# Patient Record
Sex: FEMALE | Race: WHITE | NOT HISPANIC OR LATINO | Employment: FULL TIME | URBAN - METROPOLITAN AREA
[De-identification: names, ages, dates, MRNs, and addresses within clinical notes are randomized per-mention and may not be internally consistent; named-entity substitution may affect disease eponyms.]

---

## 2017-04-21 ENCOUNTER — OFFICE VISIT (OUTPATIENT)
Dept: FAMILY MEDICINE | Facility: CLINIC | Age: 42
End: 2017-04-21
Payer: COMMERCIAL

## 2017-04-21 VITALS
TEMPERATURE: 99 F | SYSTOLIC BLOOD PRESSURE: 101 MMHG | HEART RATE: 75 BPM | BODY MASS INDEX: 37.74 KG/M2 | HEIGHT: 63 IN | RESPIRATION RATE: 16 BRPM | DIASTOLIC BLOOD PRESSURE: 73 MMHG | WEIGHT: 213 LBS | OXYGEN SATURATION: 96 %

## 2017-04-21 DIAGNOSIS — F33.1 MODERATE EPISODE OF RECURRENT MAJOR DEPRESSIVE DISORDER: ICD-10-CM

## 2017-04-21 DIAGNOSIS — J30.9 ALLERGIC RHINITIS, UNSPECIFIED ALLERGIC RHINITIS TRIGGER, UNSPECIFIED RHINITIS SEASONALITY: Primary | ICD-10-CM

## 2017-04-21 DIAGNOSIS — R68.82 DECREASED LIBIDO: ICD-10-CM

## 2017-04-21 PROCEDURE — 99999 PR PBB SHADOW E&M-EST. PATIENT-LVL III: CPT | Mod: PBBFAC,,, | Performed by: FAMILY MEDICINE

## 2017-04-21 PROCEDURE — 1160F RVW MEDS BY RX/DR IN RCRD: CPT | Mod: S$GLB,,, | Performed by: FAMILY MEDICINE

## 2017-04-21 PROCEDURE — 99214 OFFICE O/P EST MOD 30 MIN: CPT | Mod: S$GLB,,, | Performed by: FAMILY MEDICINE

## 2017-04-21 RX ORDER — BENZONATATE 100 MG/1
CAPSULE ORAL
Refills: 0 | COMMUNITY
Start: 2017-04-08 | End: 2017-04-21

## 2017-04-21 RX ORDER — FLUTICASONE PROPIONATE 50 MCG
2 SPRAY, SUSPENSION (ML) NASAL DAILY
Qty: 16 G | Refills: 2 | Status: SHIPPED | OUTPATIENT
Start: 2017-04-21 | End: 2017-07-10 | Stop reason: SDUPTHER

## 2017-04-21 RX ORDER — BUPROPION HYDROCHLORIDE 150 MG/1
150 TABLET ORAL DAILY
Qty: 30 TABLET | Refills: 11 | Status: SHIPPED | OUTPATIENT
Start: 2017-04-21 | End: 2018-08-17

## 2017-04-21 NOTE — PROGRESS NOTES
(Portions of this note were dictated using voice recognition software and may contain dictation related errors in spelling/grammar/syntax not found on text review)    CC:   Chief Complaint   Patient presents with    Medication Problem    Weight Gain       HPI: 42 y.o. female last seen in November.  History of depression, reviewed on prior note and also a noted .  Initially was on zoloft but had gotten off secondary sexual side effects.  For a while she felt that her depression was doing better but then on last visit in November she was concerned about needing something else for depression.  Started on Lexapro 10 mg a day.  Here for follow-up of his.    Lexapro is working but again she is having sexual problems with this and decreased libido.  She finds her mood is much better on it however.  She has also gained some weight.  She is not sure if his medication or lifestyle issues.  She admitted last time that her diet was fairly poor.  She feels that that is not significant changed much from last time although in the last week she did sign up for [employer] and has lost about 4 pounds over the last week.  She is also not really exercising right now it hopes to work this into her regimen going forward.    In the last month has had cough, nasal congestion, facial congestion.  No fevers.  Symptoms wax and wane.  Ears feel full like she is under water.  She has not taken any medications but did [place] and got a steroid shot which did not seem to help.  No chronic ALLERGY history.  No asthma symptoms.  No shortness of breath.  No vomiting or diarrhea.    Past Medical History:   Diagnosis Date    Depression, reactive     GERD (gastroesophageal reflux disease)     Hypothyroidism     Polycystic ovaries        Past Surgical History:   Procedure Laterality Date     SECTION      CHOLECYSTECTOMY      COLONOSCOPY N/A 2016    Procedure: COLONOSCOPY;  Surgeon: Tripp Fletcher  MD Doris;  Location: [facility];  Service: Endoscopy;  Laterality: N/A;    LAPAROSCOPIC GASTRIC BANDING         Family History   Problem Relation Age of Onset    Breast cancer Neg Hx     Colon cancer Maternal Grandmother     Colon polyps Mother      Several precancerous polyps       Social History     Social History    Marital status:      Spouse name: N/A    Number of children: N/A    Years of education: N/A     Occupational History    Not on file.     Social History Main Topics    Smoking status: Former Smoker     Packs/day: 1.00     Years: 17.00     Types: Cigarettes     Quit date: 8/16/2015    Smokeless tobacco: Never Used    Alcohol use 0.0 oz/week     0 Standard drinks or equivalent per week      Comment: rarely    Drug use: No    Sexual activity: Yes     Partners: Male      Comment:  had vasectomy     Other Topics Concern    Not on file     Social History Narrative         Lab Results   Component Value Date    WBC 7.58 07/21/2015    HGB 13.9 07/21/2015    HCT 41.6 07/21/2015     07/21/2015    CHOL 187 07/21/2015    TRIG 130 07/21/2015    HDL 41 07/21/2015    ALT 27 12/01/2015    AST 16 12/01/2015     12/01/2015    K 4.5 12/01/2015     12/01/2015    CREATININE 0.8 12/01/2015    BUN 12 12/01/2015    CO2 26 12/01/2015    TSH 1.186 07/21/2015    HGBA1C 5.4 07/21/2015    LDLCALC 120.0 07/21/2015    GLU 75 12/01/2015       ROS:  GENERAL: Fatigue, weight gain.  SKIN: No rashes, no itching.  HEAD: Headaches.  EYES: No visual changes  EARS: No ear pain or changes in hearing.  NOSE: Above  MOUTH & THROAT: No hoarseness, change in voice, or sore throat.  NODES: Denies swollen glands.  CHEST: Cough, sometimes productive.  CARDIOVASCULAR: Denies chest pain, PND, orthopnea.  ABDOMEN: No nausea, vomiting, or changes in bowel function.  URINARY: No flank pain, dysuria or hematuria.  PERIPHERAL VASCULAR: No claudication or cyanosis.  MUSCULOSKELETAL: No joint stiffness or swelling.  Denies back pain.  NEUROLOGIC: No weakness or numbness.    Vital signs reviewed  Weight today is 213 pounds, was 202 in November 2016, 196 in September 2016    PE:   APPEARANCE: Well nourished, well developed, in no acute distress.    HEAD: Normocephalic, atraumatic. No sinus ttp  EYES: PERRL. EOMI.   Conjunctivae noninjected.  EARS: TM's intact. Light reflex normal. No retraction or perforation serous effusion behind both TMs  NOSE: Turbinates congested bilaterally  MOUTH & THROAT: No tonsillar enlargement. No pharyngeal erythema or exudate.   NECK: Supple with no cervical lymphadenopathy.    CHEST: Good inspiratory effort. Lungs clear to auscultation with no wheezes or crackles.  CARDIOVASCULAR: Normal S1, S2. No rubs, murmurs, or gallops.  ABDOMEN: Bowel sounds normal. Not distended. Soft. No tenderness or masses. No organomegaly.  EXTREMITIES: No edema, cyanosis, or clubbing.  PSYCHIATRIC: Mood is described as overall euthymic on her medication as above.  Affect: The office.    IMPRESSION  1. Allergic rhinitis, unspecified allergic rhinitis trigger, unspecified rhinitis seasonality    2. Moderate episode of recurrent major depressive disorder    3. Decreased libido            PLAN  Patient does take Benadryl every night for sleep.  She can take Claritin or Allegra in the morning for the next month.  Additionally advise Flonase 2 sprays each nostril once daily for the next 3-4 weeks.  Notify if symptoms are worsening despite the above treatment.  No signs on exam of acute bacterial sinusitis, streptococcal pharyngitis, otitis media    Depression, treated well with Lexapro but unfortunately hampered by sexual side effects to medication.  Also had similar issues with Zoloft in the past.  We will try Wellbutrin and patient was counseled that should be neutral with respect essential side effects and weight.  Start at 150 XL daily, potentially could titrated 300 if needed.  She will stop her Lexapro next    She is  counseled on continuing with trying to improve her nutrition and trying to work and exercise to help with weight as well as    Expect to see her back in the next 2-3 months for her health maintenance physical exam and review of health screenings.  She will be due for her lab work including thyroid profile given her hypothyroid history.  Return sooner if needed.

## 2017-04-21 NOTE — MR AVS SNAPSHOT
[address]  [place] [address]  Phone: [phone]  Fax: [phone]                  Nicolle Gresham   2017 8:40 AM   Office Visit    Description:  Female : 1975   Provider:  Deonte Perdomo MD   Department:  [facility]           Reason for Visit     Medication Problem     Weight Gain           Diagnoses this Visit        Comments    Allergic rhinitis, unspecified allergic rhinitis trigger, unspecified rhinitis seasonality    -  Primary     Moderate episode of recurrent major depressive disorder         Decreased libido                To Do List           Future Appointments        Provider Department Dept Phone    2017 1:00 PM Deonte Perdomo MD [facility] -[phone]      Goals (5 Years of Data)     None       These Medications        Disp Refills Start End    fluticasone (FLONASE) 50 mcg/actuation nasal spray 16 g 2 2017    2 sprays by Each Nare route once daily. - Each Nare    Pharmacy: [facility] [address] [place] [address] [facility] [address] Ph #: [phone]       buPROPion (WELLBUTRIN XL) 150 MG TB24 tablet 30 tablet 11 2017    Take 1 tablet (150 mg total) by mouth once daily. - Oral    Pharmacy: [facility] [address] [place] [address] LATOSHA CHEN  [address] AT [address] Ph #: [phone]         [facility]     [facility] Nurse Care Line - 24/ Assistance  Unless otherwise directed by your provider, please contact [facility], our nurse care line that is available for 24/7 assistance.     Registered nurses in the [facility] provide: appointment scheduling, clinical advisement, health education, and other advisory services.  Call: [phone] (toll free)               Medications           Message regarding Medications     Verify the changes and/or additions to your medication regime listed  "below are the same as discussed with your clinician today.  If any of these changes or additions are incorrect, please notify your healthcare provider.        START taking these NEW medications        Refills    fluticasone (FLONASE) 50 mcg/actuation nasal spray 2    Si sprays by Each Nare route once daily.    Class: Normal    Route: Each Nare    buPROPion (WELLBUTRIN XL) 150 MG TB24 tablet 11    Sig: Take 1 tablet (150 mg total) by mouth once daily.    Class: Normal    Route: Oral      STOP taking these medications     pantoprazole (PROTONIX) 40 MG tablet Take 1 tablet (40 mg total) by mouth once daily.    hyoscyamine (LEVSIN/SL) 0.125 mg Subl Place 1 tablet (0.125 mg total) under the tongue every 4 (four) hours as needed.    benzonatate (TESSALON) 100 MG capsule     escitalopram oxalate (LEXAPRO) 10 MG tablet Take 1 tablet (10 mg total) by mouth once daily.           Verify that the below list of medications is an accurate representation of the medications you are currently taking.  If none reported, the list may be blank. If incorrect, please contact your healthcare provider. Carry this list with you in case of emergency.           Current Medications     levothyroxine (SYNTHROID) 50 MCG tablet TAKE 1 TABLET BY MOUTH EVERY MORNING BEFORE BREAKFAST    buPROPion (WELLBUTRIN XL) 150 MG TB24 tablet Take 1 tablet (150 mg total) by mouth once daily.    fluticasone (FLONASE) 50 mcg/actuation nasal spray 2 sprays by Each Nare route once daily.           Clinical Reference Information           Your Vitals Were     BP Pulse Temp Resp Height Weight    101/73 75 98.8 °F (37.1 °C) (Oral) 16 5' 3" (1.6 m) 96.6 kg (213 lb)    SpO2 BMI             96% 37.73 kg/m2         Blood Pressure          Most Recent Value    BP  101/73      Allergies as of 2017     Penicillins      Immunizations Administered on Date of Encounter - 2017     None      Language Assistance Services     ATTENTION: Language assistance services " are available, free of charge. Please call [phone].      ATENCIÓN: Si habla deyvi, tiene a shah disposición servicios gratuitos de asistencia lingüística. Llame al [phone].     CHÚ Ý: N?u b?n nói Ti?ng Vi?t, có các d?ch v? h? tr? ngôn ng? mi?n phí dành cho b?n. G?i s? [phone].         [facility] complies with applicable Federal civil rights laws and does not discriminate on the basis of race, color, national origin, age, disability, or sex.

## 2017-06-27 ENCOUNTER — OFFICE VISIT (OUTPATIENT)
Dept: FAMILY MEDICINE | Facility: CLINIC | Age: 42
End: 2017-06-27
Payer: COMMERCIAL

## 2017-06-27 VITALS
SYSTOLIC BLOOD PRESSURE: 107 MMHG | BODY MASS INDEX: 37.85 KG/M2 | DIASTOLIC BLOOD PRESSURE: 76 MMHG | TEMPERATURE: 99 F | OXYGEN SATURATION: 97 % | HEART RATE: 79 BPM | HEIGHT: 63 IN | WEIGHT: 213.63 LBS

## 2017-06-27 DIAGNOSIS — F41.9 ANXIETY: ICD-10-CM

## 2017-06-27 DIAGNOSIS — F32.9 DEPRESSION, REACTIVE: ICD-10-CM

## 2017-06-27 DIAGNOSIS — E28.2 PCOS (POLYCYSTIC OVARIAN SYNDROME): ICD-10-CM

## 2017-06-27 DIAGNOSIS — E03.9 HYPOTHYROIDISM, UNSPECIFIED TYPE: ICD-10-CM

## 2017-06-27 DIAGNOSIS — Z00.00 ROUTINE GENERAL MEDICAL EXAMINATION AT A HEALTH CARE FACILITY: Primary | ICD-10-CM

## 2017-06-27 PROCEDURE — 99999 PR PBB SHADOW E&M-EST. PATIENT-LVL III: CPT | Mod: PBBFAC,,, | Performed by: FAMILY MEDICINE

## 2017-06-27 PROCEDURE — 99396 PREV VISIT EST AGE 40-64: CPT | Mod: S$GLB,,, | Performed by: FAMILY MEDICINE

## 2017-06-27 NOTE — MEDICAL/APP STUDENT
Clinic Note  [facility]    Subjective:      Chief Complaint:   Chief Complaint   Patient presents with    Annual Exam     HPI     Ms. Gresham is a 41 y/o female with depression/anxiety, hypothyroidism and hx of PCOS here for annual wellness exam.     She switched to Wellbutrin x6 months ago, and reporting no SE today. However her mood has been more labile and she notes ncreased anxiety since starting the medication. She last saw a counselor over 3 years ago and is open to trying therapy again. (Previously has sexual SE with Lexapro and Prozac.) She is continuing to take her 50mg Synthroid QD, denies palpitations, heat/cold intolerance, fatigue. She has also started to work on her diet/exerise regimen with her . They are logging their food and have joined a gym. She feels her mood and confidence will improve if she looses some weight. Lots of stress taking care of her 3 kids at home. Ms Gresham reports 7-9 hours of sleep, but has taken a Benedryl or Advil PM QHS x 7 years.    Hx of PCOS  - Reports distant hx of PCOS, she took Metformin at one time for this problem. Reports hirsutism, difficulty losing weight, and menstrual cycles which are lengthening.    Health maintenance  - Pap and Mammogram UTD  - Immunizations UTD    Review of Systems    Constitutional: No fevers, chills, nightsweats. No weight loss/gain, change in appetite  HEENT: Negative for auditory/visual disturbance. No sore throat, congestion, runny nose.  Respiratory: Negative for cough, shortness of breath, wheeze.   Cardiovascular: Negative for chest pain and palpitations.   Gastrointestinal: Negative for abdominal pain, constipation, diarrhea, nausea and vomiting. No blood in stool.   Genitourinary: Negative for dysuria, frequency.   Musculoskeletal: Negative for back pain.  Skin: Negative for skin changes, echymoses, or rash.   Neurological: Negative for dizziness, light-headedness and headaches.   Psychiatric/Behavioral: Negative  for dysphoria, SI, visual/auditory hallucinations.    Past Medical History:   Diagnosis Date    Depression, reactive     GERD (gastroesophageal reflux disease)     Hypothyroidism     Polycystic ovaries        Past Surgical History:   Procedure Laterality Date     SECTION      CHOLECYSTECTOMY      COLONOSCOPY N/A 2016    Procedure: COLONOSCOPY;  Surgeon: Tripp Fletcher Jr., MD;  Location: [facility];  Service: Endoscopy;  Laterality: N/A;    LAPAROSCOPIC GASTRIC BANDING       Current Outpatient Prescriptions:     buPROPion (WELLBUTRIN XL) 150 MG TB24 tablet, Take 1 tablet (150 mg total) by mouth once daily., Disp: 30 tablet, Rfl: 11    levothyroxine (SYNTHROID) 50 MCG tablet, TAKE 1 TABLET BY MOUTH EVERY MORNING BEFORE BREAKFAST, Disp: 90 tablet, Rfl: 1    Review of patient's allergies indicates:   Allergen Reactions    Penicillins Nausea And Vomiting     Other reaction(s): Vomiting     Family History   Problem Relation Age of Onset    Colon polyps Mother      Several precancerous polyps    Colon cancer Maternal Grandmother     Breast cancer Neg Hx        Social History   Substance Use Topics    Smoking status: Former Smoker     Packs/day: 1.00     Years: 17.00     Types: Cigarettes     Quit date: 2015    Smokeless tobacco: Never Used    Alcohol use 0.0 oz/week      Comment: rarely     Objective:     Vitals:    17 1304   BP: 107/76   Pulse: 79   Temp: 98.9 °F (37.2 °C)     Physical Exam   Constitutional: Appears well-developed and well-nourished. No acute distress.   Head: Normocephalic and atraumatic.   Mouth/Throat: Oropharynx is clear and moist.   Eyes: PERRLA.   Ears: Normal TM bilaterally.  Neck: Neck supple. Normal ROM. No cervical lymphadenopathy.  Cardiovascular: RRR, s1/s2 nl, intact distal pulses.   Pulmonary/Chest: Normal respiratory effort.  Lungs CTAB.  Abdominal: BS+. SNT. No masses.  Musculoskeletal: Normal ROM. No edema.  Neurological: AOx3. DTRs 2+.   Skin:  Skin is warm and dry. No rashes.    Relevant Labs: last done in 2015, reviewed       Assessment:       Nicolle Gresham is a 42 y.o. female with depression/anxiety and hypothyroidism here for annual wellness exam.    1. Routine general medical examination at a health care facility    2. Depression, reactive    3. Hypothyroidism, unspecified type    4. Anxiety    5. PCOS (polycystic ovarian syndrome)    6. BMI 37.0-37.9, adult      Plan:       Annual wellness exam  -- CBC, CMP, TSH, Lipids ordered today  -- Screening: Mammogram Sept 2016, Pap Sept 2016  -- Immunizations: UTD  -- Continue improving diet/exercise regimen. Discussed myplate options.    Depression/Anxiety  -- Continue current Wellbutrin 150 mg dose.   -- Recc pt check EAP or insurance company for therapy options. Discussed that CBT combined with antidepressant rx is most effective.  -- Recc continue exercising more, can help with sxs.  -- Discussed adding Buspar for anxiety sxs. Pt would prefer to work on weight loss. Will reconsider in 3 month f/u.    Hx of PCOS  -- Testosterone, LH/FSH ratio ordered    Hypothyroidism  -- Continue Synthroid 50mg QD.    F/U: 3 months     GIA Davis4  [facility]

## 2017-06-27 NOTE — PROGRESS NOTES
(Portions of this note were dictated using voice recognition software and may contain dictation related errors in spelling/grammar/syntax not found on text review)    CC:   Chief Complaint   Patient presents with    Annual Exam       HPI: 42 y.o. female here for annual exam.  Last visit was in April for concern about depression medication and decreased libido and weight gain.  Had been on Lexapro.  Had similar issues with Zoloft in the past.  Lexapro stopped.  Wellbutrin was started at that visit at 150 mg daily to be titrated to 300 mg if needed.  She states that her anxiety is a little bit worse since starting Wellbutrin.  Some day she feels well but other that she will feel more anxious.  Usually life stressors can play a part, has 3 kids, middle child, 9 years old, has autism.  She has started however an exercise program with her  along with tracking her meals and eating much healthier.  She was able to lose weight doing this more consistently in the past.  She is looking forward to losing weight with this regimen.  She is also hoping this will help with her mood.    Also has hypothyroidism on Synthroid 50 µg daily    Diagnosis of PCO S in the past, she was on metformin for a period of time in the past.  Had problems with oligomenorrhea at the time of diagnosis.  She states that her periods are regular but have sometimes more spaced out about 4-5 weeks apart  No oral contraception.  LMP was Yaima 15, 2017    Past Medical History:   Diagnosis Date    Depression, reactive     GERD (gastroesophageal reflux disease)     Hypothyroidism     Polycystic ovaries        Past Surgical History:   Procedure Laterality Date     SECTION      CHOLECYSTECTOMY      COLONOSCOPY N/A 2016    Procedure: COLONOSCOPY;  Surgeon: Tripp Fletcher Jr., MD;  Location: [facility];  Service: Endoscopy;  Laterality: N/A;    LAPAROSCOPIC GASTRIC BANDING         Family History   Problem Relation Age of Onset     Breast cancer Neg Hx     Colon cancer Maternal Grandmother     Colon polyps Mother      Several precancerous polyps       Social History     Social History    Marital status:      Spouse name: N/A    Number of children: N/A    Years of education: N/A     Occupational History    Not on file.     Social History Main Topics    Smoking status: Former Smoker     Packs/day: 1.00     Years: 17.00     Types: Cigarettes     Quit date: 8/16/2015    Smokeless tobacco: Never Used    Alcohol use 0.0 oz/week      Comment: rarely    Drug use: No    Sexual activity: Yes     Partners: Male      Comment:  had vasectomy     Other Topics Concern    Not on file     Social History Narrative    No narrative on file         HEALTH SCREENINGS  Immunizations:  Tdap 2013    Age/Gender Appropriate screenings:  Mammogram September 2016, asymmetry in the left breast is benign-negative, recommend one-year mammogram  Pap smear with Dr. Streeter September 2016, normal.    Labs:  Lab Results   Component Value Date    WBC 7.58 07/21/2015    HGB 13.9 07/21/2015    HCT 41.6 07/21/2015     07/21/2015    CHOL 187 07/21/2015    TRIG 130 07/21/2015    HDL 41 07/21/2015    ALT 27 12/01/2015    AST 16 12/01/2015     12/01/2015    K 4.5 12/01/2015     12/01/2015    CREATININE 0.8 12/01/2015    BUN 12 12/01/2015    CO2 26 12/01/2015    TSH 1.186 07/21/2015    HGBA1C 5.4 07/21/2015    LDLCALC 120.0 07/21/2015    GLU 75 12/01/2015               Vital signs reviewed  PE:   APPEARANCE: Well nourished, well developed, in no acute distress.    HEAD: Normocephalic, atraumatic.  EYES: PERRL. EOMI.   Conjunctivae noninjected.  EARS: TM's intact. Light reflex normal. No retraction or perforation.    NOSE: Mucosa pink. Airway clear.  MOUTH & THROAT: No tonsillar enlargement. No pharyngeal erythema or exudate.   NECK: Supple with no cervical lymphadenopathy.  No carotid bruits, no thyromegaly  CHEST: Good inspiratory effort.  Lungs clear to auscultation with no wheezes or crackles.  CARDIOVASCULAR: Normal S1, S2. No rubs, murmurs, or gallops.  ABDOMEN: Bowel sounds normal. Not distended. Soft. No tenderness or masses. No organomegaly.  EXTREMITIES: No edema, cyanosis, or clubbing.      IMPRESSION  1. Routine general medical examination at a health care facility    2. Depression, reactive    3. Hypothyroidism, unspecified type    4. Anxiety    5. PCOS (polycystic ovarian syndrome)    6. BMI 37.0-37.9, adult        PLAN  Orders Placed This Encounter   Procedures    CBC auto differential    Comprehensive metabolic panel    Hemoglobin A1c    Lipid panel    TSH    Testosterone    Luteinizing hormone    Follicle stimulating hormone     Labs above.  Add LH FSH testosterone given PCO S history.  Continue with Wellbutrin 150 mg daily.  Could consider adding back low-dose SSRI versus trying buspirone in addition to Wellbutrin to help with some of the residual anxiety issues.  However, hopefully with proper lifestyle modification, dietary control, exercise regularly, and weight loss, her symptoms will improve as well.  She is interested in trying lifestyle modification first and three-month follow-up to assess how her symptoms are doing.  We also discussed talk therapy.  Can look into employee assistance program through her employer or through her insurance company for counseling services     She has a health screening form that will need to be filled out.  She forgot it today but will have this faxed over    Hypothyroidism: Continue Synthroid 50 MCG daily.  Check labs.    RTC 3 months

## 2017-06-28 ENCOUNTER — LAB VISIT (OUTPATIENT)
Dept: LAB | Facility: HOSPITAL | Age: 42
End: 2017-06-28
Attending: FAMILY MEDICINE
Payer: COMMERCIAL

## 2017-06-28 DIAGNOSIS — Z00.00 ROUTINE GENERAL MEDICAL EXAMINATION AT A HEALTH CARE FACILITY: ICD-10-CM

## 2017-06-28 DIAGNOSIS — E28.2 PCOS (POLYCYSTIC OVARIAN SYNDROME): ICD-10-CM

## 2017-06-28 DIAGNOSIS — E03.9 HYPOTHYROIDISM, UNSPECIFIED TYPE: ICD-10-CM

## 2017-06-28 LAB
ALBUMIN SERPL BCP-MCNC: 3.8 G/DL
ALP SERPL-CCNC: 83 U/L
ALT SERPL W/O P-5'-P-CCNC: 16 U/L
ANION GAP SERPL CALC-SCNC: 7 MMOL/L
AST SERPL-CCNC: 16 U/L
BASOPHILS # BLD AUTO: 0.02 K/UL
BASOPHILS NFR BLD: 0.4 %
BILIRUB SERPL-MCNC: 0.4 MG/DL
BUN SERPL-MCNC: 17 MG/DL
CALCIUM SERPL-MCNC: 9.3 MG/DL
CHLORIDE SERPL-SCNC: 106 MMOL/L
CHOLEST/HDLC SERPL: 5.2 {RATIO}
CO2 SERPL-SCNC: 25 MMOL/L
CREAT SERPL-MCNC: 1 MG/DL
DIFFERENTIAL METHOD: NORMAL
EOSINOPHIL # BLD AUTO: 0.1 K/UL
EOSINOPHIL NFR BLD: 2.3 %
ERYTHROCYTE [DISTWIDTH] IN BLOOD BY AUTOMATED COUNT: 13 %
EST. GFR  (AFRICAN AMERICAN): >60 ML/MIN/1.73 M^2
EST. GFR  (NON AFRICAN AMERICAN): >60 ML/MIN/1.73 M^2
ESTIMATED AVG GLUCOSE: 105 MG/DL
FSH SERPL-ACNC: 5.7 MIU/ML
GLUCOSE SERPL-MCNC: 97 MG/DL
HBA1C MFR BLD HPLC: 5.3 %
HCT VFR BLD AUTO: 39.3 %
HDL/CHOLESTEROL RATIO: 19.2 %
HDLC SERPL-MCNC: 182 MG/DL
HDLC SERPL-MCNC: 35 MG/DL
HGB BLD-MCNC: 13.2 G/DL
LDLC SERPL CALC-MCNC: 118.8 MG/DL
LH SERPL-ACNC: 4.4 MIU/ML
LYMPHOCYTES # BLD AUTO: 2 K/UL
LYMPHOCYTES NFR BLD: 37.8 %
MCH RBC QN AUTO: 30.4 PG
MCHC RBC AUTO-ENTMCNC: 33.6 %
MCV RBC AUTO: 91 FL
MONOCYTES # BLD AUTO: 0.4 K/UL
MONOCYTES NFR BLD: 8.1 %
NEUTROPHILS # BLD AUTO: 2.7 K/UL
NEUTROPHILS NFR BLD: 51 %
NONHDLC SERPL-MCNC: 147 MG/DL
PLATELET # BLD AUTO: 273 K/UL
PMV BLD AUTO: 10.2 FL
POTASSIUM SERPL-SCNC: 4.1 MMOL/L
PROT SERPL-MCNC: 7.2 G/DL
RBC # BLD AUTO: 4.34 M/UL
SODIUM SERPL-SCNC: 138 MMOL/L
TESTOST SERPL-MCNC: 38 NG/DL
TRIGL SERPL-MCNC: 141 MG/DL
TSH SERPL DL<=0.005 MIU/L-ACNC: 1.77 UIU/ML
WBC # BLD AUTO: 5.21 K/UL

## 2017-06-28 PROCEDURE — 83001 ASSAY OF GONADOTROPIN (FSH): CPT

## 2017-06-28 PROCEDURE — 84443 ASSAY THYROID STIM HORMONE: CPT

## 2017-06-28 PROCEDURE — 83002 ASSAY OF GONADOTROPIN (LH): CPT

## 2017-06-28 PROCEDURE — 83036 HEMOGLOBIN GLYCOSYLATED A1C: CPT

## 2017-06-28 PROCEDURE — 80061 LIPID PANEL: CPT

## 2017-06-28 PROCEDURE — 36415 COLL VENOUS BLD VENIPUNCTURE: CPT

## 2017-06-28 PROCEDURE — 85025 COMPLETE CBC W/AUTO DIFF WBC: CPT

## 2017-06-28 PROCEDURE — 84403 ASSAY OF TOTAL TESTOSTERONE: CPT

## 2017-06-28 PROCEDURE — 80053 COMPREHEN METABOLIC PANEL: CPT

## 2017-06-30 ENCOUNTER — PATIENT MESSAGE (OUTPATIENT)
Dept: FAMILY MEDICINE | Facility: CLINIC | Age: 42
End: 2017-06-30

## 2017-06-30 RX ORDER — LEVOTHYROXINE SODIUM 50 UG/1
TABLET ORAL
Qty: 90 TABLET | Refills: 11 | Status: SHIPPED | OUTPATIENT
Start: 2017-06-30 | End: 2018-07-19 | Stop reason: SDUPTHER

## 2017-07-10 RX ORDER — FLUTICASONE PROPIONATE 50 MCG
SPRAY, SUSPENSION (ML) NASAL
Qty: 1 BOTTLE | Refills: 0 | Status: SHIPPED | OUTPATIENT
Start: 2017-07-10 | End: 2018-08-17

## 2018-03-26 ENCOUNTER — OFFICE VISIT (OUTPATIENT)
Dept: URGENT CARE | Facility: CLINIC | Age: 43
End: 2018-03-26
Payer: COMMERCIAL

## 2018-03-26 VITALS
DIASTOLIC BLOOD PRESSURE: 78 MMHG | HEART RATE: 80 BPM | HEIGHT: 63 IN | RESPIRATION RATE: 12 BRPM | OXYGEN SATURATION: 98 % | SYSTOLIC BLOOD PRESSURE: 126 MMHG | TEMPERATURE: 98 F | WEIGHT: 215 LBS | BODY MASS INDEX: 38.09 KG/M2

## 2018-03-26 DIAGNOSIS — J40 BRONCHITIS: Primary | ICD-10-CM

## 2018-03-26 PROCEDURE — 96372 THER/PROPH/DIAG INJ SC/IM: CPT | Mod: S$GLB,,, | Performed by: FAMILY MEDICINE

## 2018-03-26 PROCEDURE — 99214 OFFICE O/P EST MOD 30 MIN: CPT | Mod: 25,S$GLB,, | Performed by: FAMILY MEDICINE

## 2018-03-26 RX ORDER — ALBUTEROL SULFATE 90 UG/1
2 AEROSOL, METERED RESPIRATORY (INHALATION) EVERY 6 HOURS PRN
Qty: 1 INHALER | Refills: 0 | Status: SHIPPED | OUTPATIENT
Start: 2018-03-26 | End: 2018-08-17

## 2018-03-26 RX ORDER — AZITHROMYCIN 250 MG/1
TABLET, FILM COATED ORAL
Qty: 6 TABLET | Refills: 0 | Status: SHIPPED | OUTPATIENT
Start: 2018-03-26 | End: 2018-08-17

## 2018-03-26 RX ORDER — BETAMETHASONE SODIUM PHOSPHATE AND BETAMETHASONE ACETATE 3; 3 MG/ML; MG/ML
6 INJECTION, SUSPENSION INTRA-ARTICULAR; INTRALESIONAL; INTRAMUSCULAR; SOFT TISSUE
Status: COMPLETED | OUTPATIENT
Start: 2018-03-26 | End: 2018-03-26

## 2018-03-26 RX ADMIN — BETAMETHASONE SODIUM PHOSPHATE AND BETAMETHASONE ACETATE 6 MG: 3; 3 INJECTION, SUSPENSION INTRA-ARTICULAR; INTRALESIONAL; INTRAMUSCULAR; SOFT TISSUE at 01:03

## 2018-03-26 NOTE — PATIENT INSTRUCTIONS
What Is Acute Bronchitis?  Acute bronchitis is when the airways in your lungs (bronchial tubes) become red and swollen (inflamed). It is usually caused by a viral infection. But it can also occur because of a bacteria or allergen. Symptoms include a cough that produces yellow or greenish mucus and can last for days or sometimes weeks.  Inside healthy lungs    Air travels in and out of the lungs through the airways. The linings of these airways produce sticky mucus. This mucus traps particles that enter the lungs. Tiny structures called cilia then sweep the particles out of the airways.     Healthy airway: Airways are normally open. Air moves in and out easily.      Healthy cilia: Tiny, hairlike cilia sweep mucus and particles up and out of the airways.   Lungs with bronchitis  Bronchitis often occurs with a cold or the flu virus. The airways become inflamed (red and swollen). There is a deep hacking cough from the extra mucus. Other symptoms may include:  · Wheezing  · Chest discomfort  · Shortness of breath  · Mild fever  A second infection, this time due to bacteria, may then occur. And airways irritated by allergens or smoke are more likely to get infected.        Inflamed airway: Inflammation and extra mucus narrow the airway, causing shortness of breath.      Impaired cilia: Extra mucus impairs cilia, causing congestion and wheezing. Smoking makes the problem worse.   Making a diagnosis  A physical exam, health history, and certain tests help your healthcare provider make the diagnosis.  Health history  Your healthcare provider will ask you about your symptoms.  The exam  Your provider listens to your chest for signs of congestion. He or she may also check your ears, nose, and throat.  Possible tests  · A sputum test for bacteria. This requires a sample of mucus from your lungs.  · A nasal or throat swab. This tests to see if you have a bacterial infection.  · A chest X-ray. This is done if your healthcare  provider thinks you have pneumonia.  · Tests to check for an underlying condition. Other tests may be done to check for things such as allergies, asthma, or COPD (chronic obstructive pulmonary disease). You may need to see a specialist for more lung function testing.  Treating a cough  The main treatment for bronchitis is easing symptoms. Avoiding smoke, allergens, and other things that trigger coughing can often help. If the infection is bacterial, you may be given antibiotics. During the illness, it's important to get plenty of sleep. To ease symptoms:  · Dont smoke. Also avoid secondhand smoke.  · Use a humidifier. Or try breathing in steam from a hot shower. This may help loosen mucus.  · Drink a lot of water and juice. They can soothe the throat and may help thin mucus.  · Sit up or use extra pillows when in bed. This helps to lessen coughing and congestion.  · Ask your provider about using medicine. Ask about using cough medicine, pain and fever medicine, or a decongestant.  Antibiotics  Most cases of bronchitis are caused by cold or flu viruses. They dont need antibiotics to treat them, even if your mucus is thick and green or yellow. Antibiotics dont treat viral illness and antibiotics have not been shown to have any benefit in cases of acute bronchitis. Taking antibiotics when they are not needed increases your risk of getting an infection later that is antibiotic-resistant. Antibiotics can also cause severe cases of diarrhea that require other antibiotics to treat.  It is important that you accept your healthcare provider's opinion to not use antibiotics. Your provider will prescribe antibiotics if the infection is caused by bacteria. If they are prescribed:  · Take all of the medicine. Take the medicine until it is used up, even if symptoms have improved. If you dont, the bronchitis may come back.  · Take the medicines as directed. For instance, some medicines should be taken with food.  · Ask about  side effects. Ask your provider or pharmacist what side effects are common, and what to do about them.  Follow-up care  You should see your provider again in 2 to 3 weeks. By this time, symptoms should have improved. An infection that lasts longer may mean you have a more serious problem.  Prevention  · Avoid tobacco smoke. If you smoke, quit. Stay away from smoky places. Ask friends and family not to smoke around you, or in your home or car.  · Get checked for allergies.  · Ask your provider about getting a yearly flu shot. Also ask about pneumococcal or pneumonia shots.  · Wash your hands often. This helps reduce the chance of picking up viruses that cause colds and flu.  Call your healthcare provider if:  · Symptoms worsen, or you have new symptoms  · Breathing problems worsen or  become severe  · Symptoms dont get better within a week, or within 3 days of taking antibiotics   Date Last Reviewed: 2/1/2017  © 6307-5132 [employer]. [address] [place] [address]. All rights reserved. This information is not intended as a substitute for professional medical care. Always follow your healthcare professional's instructions.      Nicolle was seen today for cough.    Diagnoses and all orders for this visit:    Bronchitis  -     betamethasone acetate-betamethasone sodium phosphate injection 6 mg; Inject 1 mL (6 mg total) into the muscle one time.  -     azithromycin (ZITHROMAX Z-BUTCH) 250 MG tablet; Take 2 tablets (500 mg) on  Day 1,  followed by 1 tablet (250 mg) once daily on Days 2 through 5.            Follow Up Comments   Make sure that you follow up with your primary care doctor in the next 2-5 days if needed .  Return to the Urgent Care if signs or symptoms change and certainly if you have worsening symptoms go to the nearest emergency department for further evaluation.     Sabrina Fernandes MD

## 2018-04-08 ENCOUNTER — OFFICE VISIT (OUTPATIENT)
Dept: URGENT CARE | Facility: CLINIC | Age: 43
End: 2018-04-08
Payer: COMMERCIAL

## 2018-04-08 VITALS
BODY MASS INDEX: 38.09 KG/M2 | TEMPERATURE: 98 F | OXYGEN SATURATION: 98 % | DIASTOLIC BLOOD PRESSURE: 86 MMHG | WEIGHT: 215 LBS | RESPIRATION RATE: 18 BRPM | HEIGHT: 63 IN | HEART RATE: 88 BPM | SYSTOLIC BLOOD PRESSURE: 116 MMHG

## 2018-04-08 DIAGNOSIS — J40 BRONCHITIS: Primary | ICD-10-CM

## 2018-04-08 PROCEDURE — 99214 OFFICE O/P EST MOD 30 MIN: CPT | Mod: S$GLB,,, | Performed by: PHYSICIAN ASSISTANT

## 2018-04-08 RX ORDER — METHYLPREDNISOLONE 4 MG/1
4 TABLET ORAL DAILY
Qty: 1 PACKAGE | Refills: 0 | Status: SHIPPED | OUTPATIENT
Start: 2018-04-08 | End: 2018-08-17

## 2018-04-08 RX ORDER — DOXYCYCLINE 100 MG/1
100 CAPSULE ORAL 2 TIMES DAILY
Qty: 14 CAPSULE | Refills: 0 | Status: SHIPPED | OUTPATIENT
Start: 2018-04-08 | End: 2018-04-15

## 2018-04-08 NOTE — PROGRESS NOTES
"Subjective:       Patient ID: Nicolle Gresham is a 43 y.o. female.    Vitals:  height is 5' 3" (1.6 m) and weight is 97.5 kg (215 lb). Her tympanic temperature is 98.4 °F (36.9 °C). Her blood pressure is 116/86 and her pulse is 88. Her respiration is 18 and oxygen saturation is 98%.     Chief Complaint: Cough (3 week pt seen here 2 weeks ago for the same problem)    Cough   This is a new problem. The current episode started 1 to 4 weeks ago. The problem has been unchanged. The problem occurs constantly. The cough is productive of sputum. Associated symptoms include ear pain, headaches, nasal congestion and postnasal drip. Pertinent negatives include no chest pain, chills, eye redness, fever, myalgias, rash, sore throat, shortness of breath or wheezing. Nothing aggravates the symptoms. Risk factors for lung disease include animal exposure. She has tried OTC cough suppressant and steroid inhaler for the symptoms. The treatment provided mild relief. Her past medical history is significant for bronchitis and environmental allergies. There is no history of asthma, COPD, emphysema or pneumonia.     Review of Systems   Constitution: Positive for malaise/fatigue. Negative for chills and fever.   HENT: Positive for congestion, ear pain and postnasal drip. Negative for hoarse voice and sore throat.    Eyes: Negative for discharge and redness.   Cardiovascular: Negative for chest pain, dyspnea on exertion and leg swelling.   Respiratory: Positive for cough and sputum production. Negative for shortness of breath and wheezing.    Skin: Negative for color change and rash.   Musculoskeletal: Negative for myalgias.   Gastrointestinal: Negative for abdominal pain, diarrhea, nausea and vomiting.   Neurological: Positive for headaches.   Allergic/Immunologic: Positive for environmental allergies.       Objective:      Physical Exam   Constitutional: She is oriented to person, place, and time. Vital signs are normal. She appears " well-developed and well-nourished. She does not appear ill. No distress.   HENT:   Head: Normocephalic and atraumatic.   Right Ear: Hearing, tympanic membrane, external ear and ear canal normal.   Left Ear: Hearing, tympanic membrane, external ear and ear canal normal.   Nose: Nose normal. No mucosal edema. Right sinus exhibits no maxillary sinus tenderness and no frontal sinus tenderness. Left sinus exhibits no maxillary sinus tenderness and no frontal sinus tenderness.   Mouth/Throat: Uvula is midline and oropharynx is clear and moist. No oropharyngeal exudate, posterior oropharyngeal edema or posterior oropharyngeal erythema.   Eyes: Conjunctivae, EOM and lids are normal. Right eye exhibits no discharge. Left eye exhibits no discharge.   Neck: Normal range of motion. Neck supple.   Cardiovascular: Normal rate, regular rhythm and normal heart sounds.  Exam reveals no gallop and no friction rub.    No murmur heard.  Pulmonary/Chest: Effort normal and breath sounds normal. No respiratory distress. She has no decreased breath sounds. She has no wheezes. She has no rhonchi. She has no rales.   Persistent dry cough in clinic.   Musculoskeletal: Normal range of motion.   Lymphadenopathy:        Head (right side): No submandibular and no tonsillar adenopathy present.        Head (left side): No submandibular and no tonsillar adenopathy present.   Neurological: She is alert and oriented to person, place, and time.   Skin: Skin is warm and dry. No rash noted. She is not diaphoretic. No erythema. No pallor.   Psychiatric: She has a normal mood and affect. Her behavior is normal.   Nursing note and vitals reviewed.      Assessment:       1. Bronchitis      X-ray Chest Pa And Lateral    Result Date: 4/8/2018  EXAMINATION: XR CHEST PA AND LATERAL CLINICAL HISTORY: Cough TECHNIQUE: PA and lateral views of the chest were performed. COMPARISON: None FINDINGS: The cardiomediastinal silhouette is normal in size and midline.  Pulmonary vascularity appears within normal limits. The lungs appear clear without confluent pulmonary parenchymal opacity. No pleural fluid. Osseous structures appear intact.     No evidence of acute cardiopulmonary disease. Electronically signed by: Bret Corcoran MD Date:    04/08/2018 Time:    14:22    Plan:         Bronchitis  -     X-Ray Chest PA And Lateral; Future; Expected date: 04/08/2018  -     doxycycline (VIBRAMYCIN) 100 MG Cap; Take 1 capsule (100 mg total) by mouth 2 (two) times daily.  Dispense: 14 capsule; Refill: 0  -     methylPREDNISolone (MEDROL DOSEPACK) 4 mg tablet; Take 1 tablet (4 mg total) by mouth once daily. use as directed  Dispense: 1 Package; Refill: 0      Patient Instructions   -Please take antibiotic to completion and steroids as prescribed.  -Continue using albuterol inhaler as needed.  -Please follow up with your primary care provider as needed.    Please follow up with your primary care provider within 2-5 days if your signs and symptoms have not resolved or worsen.     If your condition worsens or fails to improve we recommend that you receive another evaluation at the emergency room immediately or contact your primary medical clinic to discuss your concerns.   You must understand that you have received an Urgent Care treatment only and that you may be released before all of your medical problems are known or treated. You, the patient, will arrange for follow up care as instructed.

## 2018-04-08 NOTE — PATIENT INSTRUCTIONS
-Please take antibiotic to completion and steroids as prescribed.  -Continue using albuterol inhaler as needed.  -Please follow up with your primary care provider as needed.    Please follow up with your primary care provider within 2-5 days if your signs and symptoms have not resolved or worsen.     If your condition worsens or fails to improve we recommend that you receive another evaluation at the emergency room immediately or contact your primary medical clinic to discuss your concerns.   You must understand that you have received an Urgent Care treatment only and that you may be released before all of your medical problems are known or treated. You, the patient, will arrange for follow up care as instructed.

## 2018-04-11 ENCOUNTER — TELEPHONE (OUTPATIENT)
Dept: URGENT CARE | Facility: CLINIC | Age: 43
End: 2018-04-11

## 2018-07-19 RX ORDER — LEVOTHYROXINE SODIUM 50 UG/1
TABLET ORAL
Qty: 90 TABLET | Refills: 3 | Status: SHIPPED | OUTPATIENT
Start: 2018-07-19 | End: 2019-07-30 | Stop reason: SDUPTHER

## 2018-08-17 ENCOUNTER — OFFICE VISIT (OUTPATIENT)
Dept: FAMILY MEDICINE | Facility: CLINIC | Age: 43
End: 2018-08-17
Payer: COMMERCIAL

## 2018-08-17 VITALS
WEIGHT: 223.31 LBS | HEART RATE: 81 BPM | HEIGHT: 63 IN | DIASTOLIC BLOOD PRESSURE: 84 MMHG | OXYGEN SATURATION: 97 % | TEMPERATURE: 98 F | BODY MASS INDEX: 39.57 KG/M2 | SYSTOLIC BLOOD PRESSURE: 112 MMHG

## 2018-08-17 DIAGNOSIS — Z00.00 ROUTINE GENERAL MEDICAL EXAMINATION AT A HEALTH CARE FACILITY: Primary | ICD-10-CM

## 2018-08-17 DIAGNOSIS — Z12.39 ENCOUNTER FOR SPECIAL SCREENING EXAMINATION FOR NEOPLASM OF BREAST: ICD-10-CM

## 2018-08-17 DIAGNOSIS — E03.9 HYPOTHYROIDISM, UNSPECIFIED TYPE: ICD-10-CM

## 2018-08-17 PROCEDURE — 99396 PREV VISIT EST AGE 40-64: CPT | Mod: S$GLB,,, | Performed by: FAMILY MEDICINE

## 2018-08-17 PROCEDURE — 99999 PR PBB SHADOW E&M-EST. PATIENT-LVL IV: CPT | Mod: PBBFAC,,, | Performed by: FAMILY MEDICINE

## 2018-08-17 NOTE — PROGRESS NOTES
(Portions of this note were dictated using voice recognition software and may contain dictation related errors in spelling/grammar/syntax not found on text review)    CC:   Chief Complaint   Patient presents with    Annual Exam       HPI: 43 y.o. female Last seen 2017    Depression:  Was on Wellbutrin last year but has gotten off of this.  Prior had been on Lexapro  in [place] but had stopped these because of weight gain and decreased libido..  She feels like she is able to deal with a lot of her stresses off of any medication at this time    Hypothyroidism on Synthroid 50 mcg daily    PCOS, was on metformin at 1 point, not currently on any medication.    Does admit to some weight gain, does plan on getting into more of an exercise regimen, healthy diet.  Reports some symptoms that as fatigue and some bloating and body stiffness.  She is compliant with her thyroid medication.     Past Medical History:   Diagnosis Date    Depression, reactive     GERD (gastroesophageal reflux disease)     Hypothyroidism     Polycystic ovaries        Past Surgical History:   Procedure Laterality Date     SECTION      CHOLECYSTECTOMY      LAPAROSCOPIC GASTRIC BANDING         Family History   Problem Relation Age of Onset    Colon polyps Mother         Several precancerous polyps    Colon cancer Maternal Grandmother     Breast cancer Neg Hx        Social History     Socioeconomic History    Marital status:      Spouse name: Not on file    Number of children: Not on file    Years of education: Not on file    Highest education level: Not on file   Social Needs    Financial resource strain: Not on file    Food insecurity - worry: Not on file    Food insecurity - inability: Not on file    Transportation needs - medical: Not on file    Transportation needs - non-medical: Not on file   Occupational History    Not on file   Tobacco Use    Smoking status: Former Smoker     Packs/day: 1.00     Years:  17.00     Pack years: 17.00     Types: Cigarettes     Last attempt to quit: 8/16/2015     Years since quitting: 3.0    Smokeless tobacco: Never Used   Substance and Sexual Activity    Alcohol use: Yes     Alcohol/week: 0.0 oz     Comment: rarely    Drug use: No    Sexual activity: Yes     Partners: Male     Comment:  had vasectomy   Other Topics Concern    Not on file   Social History Narrative    3 kids: one teenager, 10 year old with autism, and 6 year old at home.         Lab Results   Component Value Date    WBC 5.21 06/28/2017    HGB 13.2 06/28/2017    HCT 39.3 06/28/2017     06/28/2017    CHOL 182 06/28/2017    TRIG 141 06/28/2017    HDL 35 (L) 06/28/2017    ALT 16 06/28/2017    AST 16 06/28/2017     06/28/2017    K 4.1 06/28/2017     06/28/2017    CREATININE 1.0 06/28/2017    CALCIUM 9.3 06/28/2017    BUN 17 06/28/2017    CO2 25 06/28/2017    TSH 1.767 06/28/2017    HGBA1C 5.3 06/28/2017    LDLCALC 118.8 06/28/2017    GLU 97 06/28/2017                 Vital signs reviewed  PE:   APPEARANCE: Well nourished, well developed, in no acute distress.    HEAD: Normocephalic, atraumatic.  EYES: PERRL. EOMI.   Conjunctivae noninjected.  EARS: TM's intact. Light reflex normal. No retraction or perforation.    NOSE: Mucosa pink. Airway clear.  MOUTH & THROAT: No tonsillar enlargement. No pharyngeal erythema or exudate.   NECK: Supple with no cervical lymphadenopathy.  No thyromegaly.  CHEST: Good inspiratory effort. Lungs clear to auscultation with no wheezes or crackles.  CARDIOVASCULAR: Normal S1, S2. No rubs, murmurs, or gallops.  ABDOMEN: Bowel sounds normal. Not distended. Soft. No tenderness or masses. No organomegaly.  EXTREMITIES: No edema, cyanosis, or clubbing.      IMPRESSION  1. Routine general medical examination at a health care facility    2. Encounter for special screening examination for neoplasm of breast    3. Hypothyroidism, unspecified type        PLAN  Orders Placed  This Encounter   Procedures    Mammo Digital Screening Bilat with CAD    CBC auto differential    Comprehensive metabolic panel    Hemoglobin A1c    Lipid panel    TSH     Labs above    Dietary and exercise counseling    Mammogram referral    Continue Synthroid 50 mcg daily    Anxiety depression:  Continue lifestyle modification, can stay off any pharmacotherapy at this time          HEALTH SCREENINGS  Immunizations:  Tdap 2013     Age/Gender Appropriate screenings:  Mammogram September 2016, asymmetry in the left breast is benign-negative, recommend one-year mammogram  Colonoscopy 2016 (f/hx) 1 polyp, rpt 5 yrs.    Pap smear with Dr. Streeter September 2016, normal.       Answers for HPI/ROS submitted by the patient on 8/16/2018   activity change: No  unexpected weight change: No  neck pain: No  hearing loss: No  rhinorrhea: No  trouble swallowing: No  eye discharge: No  visual disturbance: Yes  chest tightness: No  wheezing: No  chest pain: No  palpitations: No  blood in stool: No  constipation: No  vomiting: No  diarrhea: No  polydipsia: No  polyuria: No  difficulty urinating: No  hematuria: No  menstrual problem: No  dysuria: No  joint swelling: No  arthralgias: No  headaches: No  weakness: No  confusion: No  dysphoric mood: No

## 2018-08-21 ENCOUNTER — HOSPITAL ENCOUNTER (OUTPATIENT)
Dept: RADIOLOGY | Facility: HOSPITAL | Age: 43
Discharge: HOME | End: 2018-08-21
Attending: FAMILY MEDICINE
Payer: COMMERCIAL

## 2018-08-21 ENCOUNTER — PATIENT MESSAGE (OUTPATIENT)
Dept: FAMILY MEDICINE | Facility: CLINIC | Age: 43
End: 2018-08-21

## 2018-08-21 DIAGNOSIS — Z12.39 ENCOUNTER FOR SPECIAL SCREENING EXAMINATION FOR NEOPLASM OF BREAST: ICD-10-CM

## 2018-08-21 PROCEDURE — 77067 SCR MAMMO BI INCL CAD: CPT | Mod: 26,,, | Performed by: RADIOLOGY

## 2018-08-21 PROCEDURE — 77067 SCR MAMMO BI INCL CAD: CPT | Mod: TC

## 2018-08-21 PROCEDURE — 77063 BREAST TOMOSYNTHESIS BI: CPT | Mod: 26,,, | Performed by: RADIOLOGY

## 2018-10-03 ENCOUNTER — OFFICE VISIT (OUTPATIENT)
Dept: OBSTETRICS AND GYNECOLOGY | Facility: CLINIC | Age: 43
End: 2018-10-03
Payer: COMMERCIAL

## 2018-10-03 VITALS
HEIGHT: 63 IN | SYSTOLIC BLOOD PRESSURE: 120 MMHG | WEIGHT: 218.69 LBS | BODY MASS INDEX: 38.75 KG/M2 | DIASTOLIC BLOOD PRESSURE: 78 MMHG

## 2018-10-03 DIAGNOSIS — Z01.419 WELL WOMAN EXAM WITH ROUTINE GYNECOLOGICAL EXAM: Primary | ICD-10-CM

## 2018-10-03 DIAGNOSIS — Z12.4 ROUTINE CERVICAL SMEAR: ICD-10-CM

## 2018-10-03 PROCEDURE — 99999 PR PBB SHADOW E&M-EST. PATIENT-LVL III: CPT | Mod: PBBFAC,,, | Performed by: OBSTETRICS & GYNECOLOGY

## 2018-10-03 PROCEDURE — 88175 CYTOPATH C/V AUTO FLUID REDO: CPT

## 2018-10-03 PROCEDURE — 99396 PREV VISIT EST AGE 40-64: CPT | Mod: S$GLB,,, | Performed by: OBSTETRICS & GYNECOLOGY

## 2018-10-03 NOTE — PROGRESS NOTES
"OBSTETRIC HISTORY:   OB History      Para Term  AB Living    1 1 1     1    SAB TAB Ectopic Multiple Live Births                      COMPREHENSIVE GYN HISTORY:  PAP History: Reports abnormal Paps .  Infection History: Denies STDs. Denies PID.  Benign History: Denies uterine fibroids. Denies ovarian cysts. Denies endometriosis.   Cancer History: Denies cervical cancer. Denies uterine cancer or hyperplasia. Denies ovarian cancer. Denies vulvar cancer or pre-cancer. Denies vaginal cancer or pre-cancer. Denies breast cancer. Denies colon cancer.  Sexual Activity History:  reports that she currently engages in sexual activity.   Menstrual History: Age of menarche: 13 years. Every 30 days, flows for 15 days. Moderate flow.  Dysmenorrhea History: Reports severe dysmenorrhea.  Contraception: Male vasectomy          HPI:   43 y.o.  Patient's last menstrual period was 2018.   Patient is  here for her annual gynecologic exam.  She has no complaints. She denies bladder, breast and bowel complaints.    ROS:  GENERAL: Denies weight gain or weight loss. Feeling well overall.   SKIN: Denies rash or lesions.   HEAD: Denies headache.   NODES: Denies enlarged lymph nodes.   CHEST: Denies shortness of breath.   ABDOMEN: No abdominal pain, constipation, diarrhea, nausea, vomiting or rectal bleeding.   URINARY: No frequency, dysuria, hematuria, or burning on urination.  REPRODUCTIVE: See HPI.   BREASTS: The patient denies pain, lumps, or nipple discharge.   HEMATOLOGIC: No easy bruisability.   MUSCULOSKELETAL: Denies joint pain or back pain.   NEUROLOGIC: Denies weakness.   PSYCHIATRIC: Denies depression, anxiety or mood swings.    PE:   /78 (BP Location: Right arm)   Ht 5' 3" (1.6 m)   Wt 99.2 kg (218 lb 11.1 oz)   LMP 2018   BMI 38.74 kg/m²   APPEARANCE: Well nourished, well developed, in no acute distress.  NECK: Neck symmetric without  thyromegaly.  NODES: No inguinal, cervical lymph node " enlargement.  CHEST: Lungs clear to auscultation.  HEART: Regular rate and rhythm, no murmurs, rubs or gallops.  ABDOMEN: Soft. No tenderness or masses. No hernias.  BREASTS: Symmetrical, no skin changes or visible lesions. No palpable masses, nipple discharge or adenopathy bilaterally.  PELVIC:   VULVA: No lesions. Normal female genitalia.  URETHRAL MEATUS: Normal size and location, no lesions, no prolapse.  URETHRA: No masses, tenderness, prolapse or scarring.  VAGINA: Moist and well rugated, no discharge, no significant cystocele or rectocele.  CERVIX: No lesions and discharge.  UTERUS: Normal size, regular shape, mobile, non-tender, bladder base nontender.  ADNEXA: No masses or tenderness.    PROCEDURES:  Pap smear    Assessment:  Normal Gynecologic Exam    Plan:  Mammogram and Colonoscopy if indicated by current recommendations.  Return to clinic in one year or for any problems or complaints.    Counseling:  Patient was counseled today on A.C.S. Pap guidelines and recommendations for yearly pelvic exams and monthly self breast exams; to see her PCP for other health maintenance. Regular exercise and healthy diet.

## 2019-02-03 ENCOUNTER — OFFICE VISIT (OUTPATIENT)
Dept: URGENT CARE | Facility: CLINIC | Age: 44
End: 2019-02-03
Payer: COMMERCIAL

## 2019-02-03 VITALS
HEART RATE: 90 BPM | TEMPERATURE: 98 F | OXYGEN SATURATION: 98 % | WEIGHT: 218 LBS | RESPIRATION RATE: 18 BRPM | DIASTOLIC BLOOD PRESSURE: 80 MMHG | BODY MASS INDEX: 38.62 KG/M2 | HEIGHT: 63 IN | SYSTOLIC BLOOD PRESSURE: 112 MMHG

## 2019-02-03 DIAGNOSIS — H65.03 BILATERAL ACUTE SEROUS OTITIS MEDIA, RECURRENCE NOT SPECIFIED: Primary | ICD-10-CM

## 2019-02-03 DIAGNOSIS — H92.03 OTALGIA OF BOTH EARS: ICD-10-CM

## 2019-02-03 PROCEDURE — 3008F BODY MASS INDEX DOCD: CPT | Mod: CPTII,S$GLB,, | Performed by: FAMILY MEDICINE

## 2019-02-03 PROCEDURE — 96372 PR INJECTION,THERAP/PROPH/DIAG2ST, IM OR SUBCUT: ICD-10-PCS | Mod: S$GLB,,, | Performed by: FAMILY MEDICINE

## 2019-02-03 PROCEDURE — 99213 PR OFFICE/OUTPT VISIT, EST, LEVL III, 20-29 MIN: ICD-10-PCS | Mod: 25,S$GLB,, | Performed by: FAMILY MEDICINE

## 2019-02-03 PROCEDURE — 99213 OFFICE O/P EST LOW 20 MIN: CPT | Mod: 25,S$GLB,, | Performed by: FAMILY MEDICINE

## 2019-02-03 PROCEDURE — 96372 THER/PROPH/DIAG INJ SC/IM: CPT | Mod: S$GLB,,, | Performed by: FAMILY MEDICINE

## 2019-02-03 PROCEDURE — 3008F PR BODY MASS INDEX (BMI) DOCUMENTED: ICD-10-PCS | Mod: CPTII,S$GLB,, | Performed by: FAMILY MEDICINE

## 2019-02-03 RX ORDER — BETAMETHASONE SODIUM PHOSPHATE AND BETAMETHASONE ACETATE 3; 3 MG/ML; MG/ML
6 INJECTION, SUSPENSION INTRA-ARTICULAR; INTRALESIONAL; INTRAMUSCULAR; SOFT TISSUE
Status: COMPLETED | OUTPATIENT
Start: 2019-02-03 | End: 2019-02-03

## 2019-02-03 RX ADMIN — BETAMETHASONE SODIUM PHOSPHATE AND BETAMETHASONE ACETATE 6 MG: 3; 3 INJECTION, SUSPENSION INTRA-ARTICULAR; INTRALESIONAL; INTRAMUSCULAR; SOFT TISSUE at 11:02

## 2019-02-03 NOTE — PROGRESS NOTES
"Subjective:       Patient ID: Nicolle Gresham is a 44 y.o. female.    Vitals:  height is 5' 3" (1.6 m) and weight is 98.9 kg (218 lb). Her temperature is 98 °F (36.7 °C). Her blood pressure is 112/80 and her pulse is 90. Her respiration is 18 and oxygen saturation is 98%.     Chief Complaint: Otalgia    C/o right ear feels clogged up, no fever, also having sinus congestion and post nasal drip, no fever      Otalgia    There is pain in the right ear. This is a new problem. Episode onset: 2 days. The problem has been gradually worsening. There has been no fever. The pain is at a severity of 2/10. The pain is mild. Pertinent negatives include no coughing, diarrhea, headaches, rash, sore throat or vomiting. She has tried NSAIDs for the symptoms. The treatment provided no relief. There is no history of a chronic ear infection.       Constitution: Negative for chills, fatigue and fever.   HENT: Positive for ear pain, congestion and sinus pain. Negative for sore throat.    Neck: Negative for painful lymph nodes.   Cardiovascular: Negative for chest pain and leg swelling.   Eyes: Negative for double vision and blurred vision.   Respiratory: Negative for cough and shortness of breath.    Gastrointestinal: Negative for nausea, vomiting and diarrhea.   Genitourinary: Negative for dysuria, frequency, urgency and history of kidney stones.   Musculoskeletal: Negative for joint pain, joint swelling, muscle cramps and muscle ache.   Skin: Negative for color change, pale, rash and bruising.   Allergic/Immunologic: Negative for seasonal allergies.   Neurological: Negative for dizziness, history of vertigo, light-headedness, passing out and headaches.   Hematologic/Lymphatic: Negative for swollen lymph nodes.   Psychiatric/Behavioral: Negative for nervous/anxious, sleep disturbance and depression. The patient is not nervous/anxious.        Objective:      Physical Exam   Constitutional: She is oriented to person, place, and time. She " appears well-developed and well-nourished. She is cooperative.  Non-toxic appearance. She does not appear ill. No distress.   HENT:   Head: Normocephalic and atraumatic.   Right Ear: Hearing, tympanic membrane, external ear and ear canal normal.   Left Ear: Hearing, tympanic membrane, external ear and ear canal normal.   Nose: Nose normal. No mucosal edema, rhinorrhea or nasal deformity. No epistaxis. Right sinus exhibits no maxillary sinus tenderness and no frontal sinus tenderness. Left sinus exhibits no maxillary sinus tenderness and no frontal sinus tenderness.   Mouth/Throat: Uvula is midline, oropharynx is clear and moist and mucous membranes are normal. No trismus in the jaw. Normal dentition. No uvula swelling. No oropharyngeal exudate or posterior oropharyngeal erythema.   tms fluid, both tms,  No erythema  No LAP    Eyes: Conjunctivae and lids are normal. Right eye exhibits no discharge. Left eye exhibits no discharge. No scleral icterus.   Sclera clear bilat   Neck: Trachea normal, normal range of motion, full passive range of motion without pain and phonation normal. Neck supple. No JVD present. No tracheal deviation present.   Cardiovascular: Normal rate, regular rhythm, normal heart sounds, intact distal pulses and normal pulses. Exam reveals no gallop and no friction rub.   No murmur heard.  Pulmonary/Chest: Effort normal and breath sounds normal. No stridor. She has no wheezes. She has no rales.   Abdominal: Soft. Normal appearance and bowel sounds are normal. She exhibits no distension, no pulsatile midline mass and no mass. There is no tenderness.   Musculoskeletal: Normal range of motion. She exhibits no edema or deformity.   Lymphadenopathy:     She has no cervical adenopathy.   Neurological: She is alert and oriented to person, place, and time. She exhibits normal muscle tone. Coordination normal.   Skin: Skin is warm, dry and intact. She is not diaphoretic. No pallor.   Psychiatric: She has a  normal mood and affect. Her speech is normal and behavior is normal. Judgment and thought content normal. Cognition and memory are normal.   Nursing note and vitals reviewed.      Assessment:       1. Bilateral acute serous otitis media, recurrence not specified    2. Otalgia of both ears        Plan:         Bilateral acute serous otitis media, recurrence not specified    Otalgia of both ears    Other orders  -     betamethasone acetate-betamethasone sodium phosphate injection 6 mg         Allegra D 12 one bid  Motrin 2 po q 6 hours prn pain  Warm compresses

## 2019-02-03 NOTE — PATIENT INSTRUCTIONS

## 2019-07-30 RX ORDER — LEVOTHYROXINE SODIUM 50 UG/1
TABLET ORAL
Qty: 90 TABLET | Refills: 0 | Status: SHIPPED | OUTPATIENT
Start: 2019-07-30 | End: 2019-10-25 | Stop reason: SDUPTHER

## 2019-10-25 RX ORDER — LEVOTHYROXINE SODIUM 50 UG/1
TABLET ORAL
Qty: 90 TABLET | Refills: 0 | Status: SHIPPED | OUTPATIENT
Start: 2019-10-25 | End: 2020-01-13 | Stop reason: SDUPTHER

## 2020-01-13 ENCOUNTER — OFFICE VISIT (OUTPATIENT)
Dept: FAMILY MEDICINE | Facility: CLINIC | Age: 45
End: 2020-01-13
Payer: COMMERCIAL

## 2020-01-13 VITALS
HEART RATE: 74 BPM | DIASTOLIC BLOOD PRESSURE: 82 MMHG | WEIGHT: 212.44 LBS | SYSTOLIC BLOOD PRESSURE: 122 MMHG | TEMPERATURE: 99 F | BODY MASS INDEX: 37.63 KG/M2 | OXYGEN SATURATION: 96 %

## 2020-01-13 DIAGNOSIS — Z23 NEED FOR IMMUNIZATION AGAINST INFLUENZA: Primary | ICD-10-CM

## 2020-01-13 DIAGNOSIS — R42 DIZZINESS: ICD-10-CM

## 2020-01-13 DIAGNOSIS — Z12.31 VISIT FOR SCREENING MAMMOGRAM: ICD-10-CM

## 2020-01-13 DIAGNOSIS — E03.9 ACQUIRED HYPOTHYROIDISM: ICD-10-CM

## 2020-01-13 DIAGNOSIS — F33.0 MILD EPISODE OF RECURRENT MAJOR DEPRESSIVE DISORDER: ICD-10-CM

## 2020-01-13 DIAGNOSIS — Z13.220 SCREENING FOR LIPID DISORDERS: ICD-10-CM

## 2020-01-13 PROCEDURE — 99214 PR OFFICE/OUTPT VISIT, EST, LEVL IV, 30-39 MIN: ICD-10-PCS | Mod: 25,S$GLB,, | Performed by: INTERNAL MEDICINE

## 2020-01-13 PROCEDURE — 90686 IIV4 VACC NO PRSV 0.5 ML IM: CPT | Mod: S$GLB,,, | Performed by: INTERNAL MEDICINE

## 2020-01-13 PROCEDURE — 3008F PR BODY MASS INDEX (BMI) DOCUMENTED: ICD-10-PCS | Mod: CPTII,S$GLB,, | Performed by: INTERNAL MEDICINE

## 2020-01-13 PROCEDURE — 99999 PR PBB SHADOW E&M-EST. PATIENT-LVL III: CPT | Mod: PBBFAC,,, | Performed by: INTERNAL MEDICINE

## 2020-01-13 PROCEDURE — 3008F BODY MASS INDEX DOCD: CPT | Mod: CPTII,S$GLB,, | Performed by: INTERNAL MEDICINE

## 2020-01-13 PROCEDURE — 90686 FLU VACCINE (QUAD) GREATER THAN OR EQUAL TO 3YO PRESERVATIVE FREE IM: ICD-10-PCS | Mod: S$GLB,,, | Performed by: INTERNAL MEDICINE

## 2020-01-13 PROCEDURE — 90471 IMMUNIZATION ADMIN: CPT | Mod: S$GLB,,, | Performed by: INTERNAL MEDICINE

## 2020-01-13 PROCEDURE — 99999 PR PBB SHADOW E&M-EST. PATIENT-LVL III: ICD-10-PCS | Mod: PBBFAC,,, | Performed by: INTERNAL MEDICINE

## 2020-01-13 PROCEDURE — 99214 OFFICE O/P EST MOD 30 MIN: CPT | Mod: 25,S$GLB,, | Performed by: INTERNAL MEDICINE

## 2020-01-13 PROCEDURE — 90471 FLU VACCINE (QUAD) GREATER THAN OR EQUAL TO 3YO PRESERVATIVE FREE IM: ICD-10-PCS | Mod: S$GLB,,, | Performed by: INTERNAL MEDICINE

## 2020-01-13 RX ORDER — ESCITALOPRAM OXALATE 5 MG/1
5 TABLET ORAL DAILY
Qty: 30 TABLET | Refills: 11 | Status: SHIPPED | OUTPATIENT
Start: 2020-01-13 | End: 2020-03-19

## 2020-01-13 RX ORDER — MECLIZINE HCL 12.5 MG 12.5 MG/1
12.5 TABLET ORAL 3 TIMES DAILY PRN
Qty: 90 TABLET | Refills: 0 | Status: SHIPPED | OUTPATIENT
Start: 2020-01-13 | End: 2022-01-10

## 2020-01-13 RX ORDER — LEVOTHYROXINE SODIUM 50 UG/1
50 TABLET ORAL EVERY MORNING
Qty: 90 TABLET | Refills: 3 | Status: SHIPPED | OUTPATIENT
Start: 2020-01-13 | End: 2021-02-12 | Stop reason: SDUPTHER

## 2020-01-13 NOTE — ASSESSMENT & PLAN NOTE
Wants to lay in bed all the time this time of year.  Doesn't feel motivated at all.  Feels like crying today, doesn't cry usually.  Has been on celexa, zoloft and wellbutrin in past.  Had decreased sex drive in past.  Feels like her depression is seasonal.

## 2020-01-13 NOTE — ASSESSMENT & PLAN NOTE
Stable on synthroid 50 mcg daily, no S/S of hypo/hyperthyroidism.  Has been on same dose since 2005.

## 2020-01-13 NOTE — PROGRESS NOTES
[facility] Note    Chief Complaint      Chief Complaint   Patient presents with    Establish Care    Dizziness     History of Present Illness      Nicolle Gresham is a 45 y.o. female who presents today to establish care for dizziness.  Patient comes to appointment alone.     Started having dizzy episodes multiple times per day about 3 weeks ago.  Can be laying down, sitting or standing.  Lasts about 1 minute.  Doesn't get nauseated.  No SOB/CP.  Has been getting headaches more often  On and off for the last 6 months.  Has never had migraines. Gets headaches 2-3 days then doesn't have for 2-3 weeks. Takes Aleve which helps.  Vision has gotten worse.  No vision issues during dizzy episodes.  Keeps getting pressure in head, feels off.  No sneezing, ithcy/watery eyes.  Has some post nasal drip.  Problem List Items Addressed This Visit     Acquired hypothyroidism    Current Assessment & Plan     Stable on synthroid 50 mcg daily, no S/S of hypo/hyperthyroidism.  Has been on same dose since 2005.         Relevant Medications    levothyroxine (SYNTHROID) 50 MCG tablet    Other Relevant Orders    CBC auto differential    Comprehensive metabolic panel    TSH    T4, free    Mild episode of recurrent major depressive disorder    Current Assessment & Plan     Wants to lay in bed all the time this time of year.  Doesn't feel motivated at all.  Feels like crying today, doesn't cry usually.  Has been on celexa, zoloft and wellbutrin in past.  Had decreased sex drive in past.  Feels like her depression is seasonal.         Relevant Medications    escitalopram oxalate (LEXAPRO) 5 MG Tab      Other Visit Diagnoses     Need for immunization against influenza    -  Primary    Relevant Orders    Influenza - Quadrivalent (PF)    Dizziness        Screening for lipid disorders        Relevant Orders    Lipid panel    Visit for screening mammogram        Relevant Orders    Mammo Digital Screening Bilat w/ Darrel           Health Maintenance   Topic Date Due    Mammogram  2020    Pap Smear with HPV Cotest  10/03/2021    TETANUS VACCINE  2023    Lipid Panel  Completed       Past Medical History:   Diagnosis Date    Depression, reactive     GERD (gastroesophageal reflux disease)     Hypothyroidism     Polycystic ovaries        Past Surgical History:   Procedure Laterality Date     SECTION      CHOLECYSTECTOMY      COLONOSCOPY N/A 2016    Procedure: COLONOSCOPY;  Surgeon: Tripp Fletcher Jr., MD;  Location: [facility];  Service: Endoscopy;  Laterality: N/A;    LAPAROSCOPIC GASTRIC BANDING         family history includes Cancer in her maternal grandmother and paternal grandfather; Colon cancer in her maternal grandmother; Colon polyps in her mother; Early death in her paternal grandmother; Hearing loss in her mother.    Social History     Tobacco Use    Smoking status: Former Smoker     Packs/day: 1.00     Years: 17.00     Pack years: 17.00     Types: Cigarettes     Last attempt to quit: 2015     Years since quittin.4    Smokeless tobacco: Never Used   Substance Use Topics    Alcohol use: Yes     Alcohol/week: 0.0 standard drinks     Frequency: Monthly or less     Drinks per session: 1 or 2     Binge frequency: Never     Comment: rarely    Drug use: No       Review of Systems   HENT: Negative for hearing loss.    Eyes: Negative for discharge.   Respiratory: Negative for wheezing.    Cardiovascular: Negative for chest pain and palpitations.   Gastrointestinal: Negative for blood in stool, constipation, diarrhea and vomiting.   Genitourinary: Negative for dysuria and hematuria.   Musculoskeletal: Negative for neck pain.   Neurological: Positive for headaches. Negative for weakness.   Endo/Heme/Allergies: Negative for polydipsia.        Outpatient Encounter Medications as of 2020   Medication Sig Dispense Refill    levothyroxine (SYNTHROID) 50 MCG tablet Take 1 tablet (50 mcg  total) by mouth every morning. 90 tablet 3    [DISCONTINUED] levothyroxine (SYNTHROID) 50 MCG tablet TAKE 1 TABLET BY MOUTH EVERY MORNING BEFORE BREAKFAST 90 tablet 0    escitalopram oxalate (LEXAPRO) 5 MG Tab Take 1 tablet (5 mg total) by mouth once daily. 30 tablet 11    meclizine (ANTIVERT) 12.5 mg tablet Take 1 tablet (12.5 mg total) by mouth 3 (three) times daily as needed for Dizziness. 90 tablet 0     No facility-administered encounter medications on file as of 1/13/2020.         Review of patient's allergies indicates:   Allergen Reactions    Penicillins Nausea And Vomiting     Other reaction(s): Vomiting       Physical Exam      Vital Signs  Temp: 98.5 °F (36.9 °C)  Temp src: Oral  Pulse: 74  SpO2: 96 %  BP: 122/82  BP Location: Left arm  Patient Position: Sitting  Pain Score: 0-No pain  Height and Weight  Weight: 96.3 kg (212 lb 6.6 oz)]    Physical Exam   Constitutional: She is oriented to person, place, and time. She appears well-developed and well-nourished.   HENT:   Head: Normocephalic and atraumatic.   Right Ear: External ear normal.   Left Ear: External ear normal.   Eyes: Right eye exhibits no discharge. Left eye exhibits no discharge.   Neck: Normal range of motion. No thyromegaly present.   Cardiovascular: Normal rate, regular rhythm, normal heart sounds and intact distal pulses.   No murmur heard.  Pulmonary/Chest: Effort normal and breath sounds normal. No respiratory distress.   Abdominal: Soft. Bowel sounds are normal. She exhibits no distension. There is no tenderness.   Musculoskeletal: Normal range of motion. She exhibits no deformity.   Neurological: She is alert and oriented to person, place, and time.   Skin: Skin is warm and dry. No rash noted.   Psychiatric: She has a normal mood and affect. Her behavior is normal.        Laboratory:  CBC:  No results for input(s): WBC, RBC, HGB, HCT, PLT, MCV, MCH, MCHC in the last 2160 hours.  CMP:  No results for input(s): GLU, CALCIUM,  ALBUMIN, PROT, NA, K, CO2, CL, BUN, ALKPHOS, ALT, AST, BILITOT in the last 2160 hours.    Invalid input(s): CREATININ  URINALYSIS:  No results for input(s): COLORU, CLARITYU, SPECGRAV, PHUR, PROTEINUA, GLUCOSEU, BILIRUBINCON, BLOODU, WBCU, RBCU, BACTERIA, MUCUS, NITRITE, LEUKOCYTESUR, UROBILINOGEN, HYALINECASTS in the last 2160 hours.   LIPIDS:  No results for input(s): TSH, HDL, CHOL, TRIG, LDLCALC, CHOLHDL, NONHDLCHOL, TOTALCHOLEST in the last 2160 hours.  TSH:  No results for input(s): TSH in the last 2160 hours.  A1C:  No results for input(s): HGBA1C in the last 2160 hours.    Radiology:  No imaging on file    Assessment/Plan     Nicolle Gresham is a 45 y.o.female with:    1. Acquired hypothyroidism  - CBC auto differential; Future  - Comprehensive metabolic panel; Future  - TSH; Future  - T4, free; Future  - levothyroxine (SYNTHROID) 50 MCG tablet; Take 1 tablet (50 mcg total) by mouth every morning.  Dispense: 90 tablet; Refill: 3    2. Need for immunization against influenza  - Influenza - Quadrivalent (PF)    3. Dizziness    4. Screening for lipid disorders  - Lipid panel; Future    5. Visit for screening mammogram  - Mammo Digital Screening Bilat w/ Darrel; Future    6. Mild episode of recurrent major depressive disorder  - escitalopram oxalate (LEXAPRO) 5 MG Tab; Take 1 tablet (5 mg total) by mouth once daily.  Dispense: 30 tablet; Refill: 11    -will try lexapro 5 mg daily for depression  -will try flonase, mucinex and antihistamine given drip, fluid seen behind TM  -flu shot today  -labs and MMG in [place]  -Continue current medications and maintain follow up with specialists.  Return to clinic in 1 month.      Evelia Peña MD  [facility]    Answers for HPI/ROS submitted by the patient on 1/9/2020   activity change: No  unexpected weight change: No  rhinorrhea: No  trouble swallowing: No  visual disturbance: Yes  chest tightness: No  polyuria: No  difficulty urinating: No  menstrual  problem: No  joint swelling: No  arthralgias: No  confusion: No  dysphoric mood: Yes

## 2020-01-15 ENCOUNTER — LAB VISIT (OUTPATIENT)
Dept: LAB | Facility: HOSPITAL | Age: 45
End: 2020-01-15
Attending: INTERNAL MEDICINE
Payer: COMMERCIAL

## 2020-01-15 ENCOUNTER — HOSPITAL ENCOUNTER (OUTPATIENT)
Dept: RADIOLOGY | Facility: HOSPITAL | Age: 45
Discharge: HOME | End: 2020-01-15
Attending: INTERNAL MEDICINE
Payer: COMMERCIAL

## 2020-01-15 ENCOUNTER — TELEPHONE (OUTPATIENT)
Dept: FAMILY MEDICINE | Facility: CLINIC | Age: 45
End: 2020-01-15

## 2020-01-15 DIAGNOSIS — E03.9 ACQUIRED HYPOTHYROIDISM: ICD-10-CM

## 2020-01-15 DIAGNOSIS — Z13.220 SCREENING FOR LIPID DISORDERS: ICD-10-CM

## 2020-01-15 DIAGNOSIS — Z12.31 VISIT FOR SCREENING MAMMOGRAM: ICD-10-CM

## 2020-01-15 LAB
ALBUMIN SERPL BCP-MCNC: 3.8 G/DL (ref 3.5–5.2)
ALP SERPL-CCNC: 84 U/L (ref 55–135)
ALT SERPL W/O P-5'-P-CCNC: 16 U/L (ref 10–44)
ANION GAP SERPL CALC-SCNC: 9 MMOL/L (ref 8–16)
AST SERPL-CCNC: 14 U/L (ref 10–40)
BASOPHILS # BLD AUTO: 0.01 K/UL (ref 0–0.2)
BASOPHILS NFR BLD: 0.2 % (ref 0–1.9)
BILIRUB SERPL-MCNC: 0.4 MG/DL (ref 0.1–1)
BUN SERPL-MCNC: 15 MG/DL (ref 6–20)
CALCIUM SERPL-MCNC: 9.3 MG/DL (ref 8.7–10.5)
CHLORIDE SERPL-SCNC: 105 MMOL/L (ref 95–110)
CHOLEST SERPL-MCNC: 181 MG/DL (ref 120–199)
CHOLEST/HDLC SERPL: 4.3 {RATIO} (ref 2–5)
CO2 SERPL-SCNC: 25 MMOL/L (ref 23–29)
CREAT SERPL-MCNC: 0.9 MG/DL (ref 0.5–1.4)
DIFFERENTIAL METHOD: NORMAL
EOSINOPHIL # BLD AUTO: 0.1 K/UL (ref 0–0.5)
EOSINOPHIL NFR BLD: 1.6 % (ref 0–8)
ERYTHROCYTE [DISTWIDTH] IN BLOOD BY AUTOMATED COUNT: 13.1 % (ref 11.5–14.5)
EST. GFR  (AFRICAN AMERICAN): >60 ML/MIN/1.73 M^2
EST. GFR  (NON AFRICAN AMERICAN): >60 ML/MIN/1.73 M^2
GLUCOSE SERPL-MCNC: 96 MG/DL (ref 70–110)
HCT VFR BLD AUTO: 41.4 % (ref 37–48.5)
HDLC SERPL-MCNC: 42 MG/DL (ref 40–75)
HDLC SERPL: 23.2 % (ref 20–50)
HGB BLD-MCNC: 13.5 G/DL (ref 12–16)
LDLC SERPL CALC-MCNC: 110.4 MG/DL (ref 63–159)
LYMPHOCYTES # BLD AUTO: 2 K/UL (ref 1–4.8)
LYMPHOCYTES NFR BLD: 38.1 % (ref 18–48)
MCH RBC QN AUTO: 29.4 PG (ref 27–31)
MCHC RBC AUTO-ENTMCNC: 32.6 G/DL (ref 32–36)
MCV RBC AUTO: 90 FL (ref 82–98)
MONOCYTES # BLD AUTO: 0.4 K/UL (ref 0.3–1)
MONOCYTES NFR BLD: 8.4 % (ref 4–15)
NEUTROPHILS # BLD AUTO: 2.6 K/UL (ref 1.8–7.7)
NEUTROPHILS NFR BLD: 51.7 % (ref 38–73)
NONHDLC SERPL-MCNC: 139 MG/DL
PLATELET # BLD AUTO: 291 K/UL (ref 150–350)
PMV BLD AUTO: 9.7 FL (ref 9.2–12.9)
POTASSIUM SERPL-SCNC: 4.2 MMOL/L (ref 3.5–5.1)
PROT SERPL-MCNC: 7.2 G/DL (ref 6–8.4)
RBC # BLD AUTO: 4.59 M/UL (ref 4–5.4)
SODIUM SERPL-SCNC: 139 MMOL/L (ref 136–145)
T4 FREE SERPL-MCNC: 0.96 NG/DL (ref 0.71–1.51)
TRIGL SERPL-MCNC: 143 MG/DL (ref 30–150)
TSH SERPL DL<=0.005 MIU/L-ACNC: 1.11 UIU/ML (ref 0.4–4)
WBC # BLD AUTO: 5.12 K/UL (ref 3.9–12.7)

## 2020-01-15 PROCEDURE — 84443 ASSAY THYROID STIM HORMONE: CPT

## 2020-01-15 PROCEDURE — 80053 COMPREHEN METABOLIC PANEL: CPT

## 2020-01-15 PROCEDURE — 36415 COLL VENOUS BLD VENIPUNCTURE: CPT

## 2020-01-15 PROCEDURE — 84439 ASSAY OF FREE THYROXINE: CPT

## 2020-01-15 PROCEDURE — 80061 LIPID PANEL: CPT

## 2020-01-15 PROCEDURE — 85025 COMPLETE CBC W/AUTO DIFF WBC: CPT

## 2020-01-15 NOTE — TELEPHONE ENCOUNTER
----- Message from Jackelyn Raymundo MA sent at 1/15/2020 10:33 AM CST -----  Called pt and lvm to call office back.

## 2020-01-15 NOTE — TELEPHONE ENCOUNTER
----- Message from Carole Jenkins sent at 1/15/2020 10:35 AM CST -----  Contact: [phone]/self  Patient called in returning your call. Please advise.

## 2020-01-15 NOTE — TELEPHONE ENCOUNTER
Patient returned our call in regards of message.Informed pt about her lab results. Pt verbalized understanding of message.

## 2020-01-21 ENCOUNTER — HOSPITAL ENCOUNTER (OUTPATIENT)
Dept: RADIOLOGY | Facility: HOSPITAL | Age: 45
Discharge: HOME | End: 2020-01-21
Attending: INTERNAL MEDICINE
Payer: COMMERCIAL

## 2020-01-21 PROCEDURE — 77067 SCR MAMMO BI INCL CAD: CPT | Mod: TC

## 2020-01-21 PROCEDURE — 77067 MAMMO DIGITAL SCREENING BILAT WITH TOMOSYNTHESIS_CAD: ICD-10-PCS | Mod: 26,,, | Performed by: RADIOLOGY

## 2020-01-21 PROCEDURE — 77063 BREAST TOMOSYNTHESIS BI: CPT | Mod: 26,,, | Performed by: RADIOLOGY

## 2020-01-21 PROCEDURE — 77067 SCR MAMMO BI INCL CAD: CPT | Mod: 26,,, | Performed by: RADIOLOGY

## 2020-01-21 PROCEDURE — 77063 MAMMO DIGITAL SCREENING BILAT WITH TOMOSYNTHESIS_CAD: ICD-10-PCS | Mod: 26,,, | Performed by: RADIOLOGY

## 2020-01-22 ENCOUNTER — TELEPHONE (OUTPATIENT)
Dept: FAMILY MEDICINE | Facility: CLINIC | Age: 45
End: 2020-01-22

## 2020-01-22 NOTE — TELEPHONE ENCOUNTER
----- Message from Evelia Peña MD sent at 1/22/2020 11:27 AM CST -----  Please let patient know her mammogram results are normal.

## 2020-03-16 ENCOUNTER — TELEPHONE (OUTPATIENT)
Dept: FAMILY MEDICINE | Facility: CLINIC | Age: 45
End: 2020-03-16

## 2020-03-16 NOTE — TELEPHONE ENCOUNTER
Called patient, KIMBERLY.  Would like to change her 3/19 appt to a video visit since it is just for lexapro follow up.

## 2020-03-17 ENCOUNTER — PATIENT MESSAGE (OUTPATIENT)
Dept: FAMILY MEDICINE | Facility: CLINIC | Age: 45
End: 2020-03-17

## 2020-03-19 ENCOUNTER — OFFICE VISIT (OUTPATIENT)
Dept: FAMILY MEDICINE | Facility: CLINIC | Age: 45
End: 2020-03-19
Payer: COMMERCIAL

## 2020-03-19 DIAGNOSIS — F33.0 MILD EPISODE OF RECURRENT MAJOR DEPRESSIVE DISORDER: Primary | ICD-10-CM

## 2020-03-19 PROCEDURE — 99214 OFFICE O/P EST MOD 30 MIN: CPT | Mod: 95,,, | Performed by: INTERNAL MEDICINE

## 2020-03-19 PROCEDURE — 99214 PR OFFICE/OUTPT VISIT, EST, LEVL IV, 30-39 MIN: ICD-10-PCS | Mod: 95,,, | Performed by: INTERNAL MEDICINE

## 2020-03-19 RX ORDER — VENLAFAXINE HYDROCHLORIDE 75 MG/1
75 CAPSULE, EXTENDED RELEASE ORAL DAILY
Qty: 30 CAPSULE | Refills: 11 | Status: SHIPPED | OUTPATIENT
Start: 2020-03-19 | End: 2022-01-10

## 2020-03-19 NOTE — PROGRESS NOTES
The patient location is: home  The chief complaint leading to consultation is: follow up depressin  Visit type: Virtual visit with synchronous audio and video  Total time spent with patient: 15 minutes  Each patient to whom he or she provides medical services by telemedicine is:  (1) informed of the relationship between the physician and patient and the respective role of any other health care provider with respect to management of the patient; and (2) notified that he or she may decline to receive medical services by telemedicine and may withdraw from such care at any time.      [facility]    Chief Complaint      Chief Complaint   Patient presents with    Depression     History of Present Illness      Nicolle Gresham is a 45 y.o. female who presents today for depression.  Patient comes to appointment via virtual visit    Problem List Items Addressed This Visit     Mild episode of recurrent major depressive disorder - Primary    Current Assessment & Plan     Not feeling sad, just feels numb.  Doesn't want to be around anyone.  No panic attacks, SI/HI. Feels different than when she was on Zoloft, Celexa (had decreased libido).  Wellbutrin made her personality different.  Sleeps well at night.  Has not been exercising.           Relevant Medications    venlafaxine (EFFEXOR-XR) 75 MG 24 hr capsule          Health Maintenance   Topic Date Due    Pap Smear with HPV Cotest  10/03/2021    Mammogram  2022    TETANUS VACCINE  2023    Lipid Panel  Completed       Past Medical History:   Diagnosis Date    Depression, reactive     GERD (gastroesophageal reflux disease)     Hypothyroidism     Polycystic ovaries        Past Surgical History:   Procedure Laterality Date     SECTION      CHOLECYSTECTOMY      COLONOSCOPY N/A 2016    Procedure: COLONOSCOPY;  Surgeon: Tripp Fletcher Jr., MD;  Location: [facility];  Service: Endoscopy;  Laterality: N/A;     LAPAROSCOPIC GASTRIC BANDING         family history includes Cancer in her maternal grandmother and paternal grandfather; Colon cancer in her maternal grandmother; Colon polyps in her mother; Early death in her paternal grandmother; Hearing loss in her mother.    Social History     Tobacco Use    Smoking status: Former Smoker     Packs/day: 1.00     Years: 17.00     Pack years: 17.00     Types: Cigarettes     Last attempt to quit: 2015     Years since quittin.5    Smokeless tobacco: Never Used   Substance Use Topics    Alcohol use: Yes     Alcohol/week: 0.0 standard drinks     Frequency: Monthly or less     Drinks per session: 1 or 2     Binge frequency: Never     Comment: rarely    Drug use: No       Review of Systems   Constitutional: Negative for chills and fever.   HENT: Negative for congestion and sore throat.    Eyes: Negative for blurred vision and discharge.   Respiratory: Negative for cough and shortness of breath.    Cardiovascular: Negative for chest pain and palpitations.   Gastrointestinal: Negative for constipation, diarrhea, nausea and vomiting.   Genitourinary: Negative for dysuria and hematuria.   Musculoskeletal: Negative for falls and myalgias.   Skin: Negative for itching and rash.   Neurological: Negative for dizziness and headaches.        Outpatient Encounter Medications as of 3/19/2020   Medication Sig Dispense Refill    levothyroxine (SYNTHROID) 50 MCG tablet Take 1 tablet (50 mcg total) by mouth every morning. 90 tablet 3    meclizine (ANTIVERT) 12.5 mg tablet Take 1 tablet (12.5 mg total) by mouth 3 (three) times daily as needed for Dizziness. 90 tablet 0    venlafaxine (EFFEXOR-XR) 75 MG 24 hr capsule Take 1 capsule (75 mg total) by mouth once daily. 30 capsule 11    [DISCONTINUED] escitalopram oxalate (LEXAPRO) 5 MG Tab Take 1 tablet (5 mg total) by mouth once daily. 30 tablet 11     No facility-administered encounter medications on file as of 3/19/2020.         Review  of patient's allergies indicates:   Allergen Reactions    Penicillins Nausea And Vomiting     Other reaction(s): Vomiting       Physical Exam       ]  No no vital signs on file    Physical Exam   Constitutional: She is oriented to person, place, and time. She appears well-developed and well-nourished.   HENT:   Head: Normocephalic and atraumatic.   Eyes: Right eye exhibits no discharge.   Neck: Normal range of motion.   Pulmonary/Chest: Effort normal. No respiratory distress.   Musculoskeletal: Normal range of motion.   Neurological: She is alert and oriented to person, place, and time. Coordination normal.   Skin: No rash noted.   Psychiatric: She has a normal mood and affect. Her behavior is normal. Judgment and thought content normal.        Laboratory:  CBC:  Recent Labs   Lab Result Units 01/15/20  0735   WBC K/uL 5.12   RBC M/uL 4.59   Hemoglobin g/dL 13.5   Hematocrit % 41.4   Platelets K/uL 291   Mean Corpuscular Volume fL 90   Mean Corpuscular Hemoglobin pg 29.4   Mean Corpuscular Hemoglobin Conc g/dL 32.6     CMP:  Recent Labs   Lab Result Units 01/15/20  0735   Glucose mg/dL 96   Calcium mg/dL 9.3   Albumin g/dL 3.8   Total Protein g/dL 7.2   Sodium mmol/L 139   Potassium mmol/L 4.2   CO2 mmol/L 25   Chloride mmol/L 105   BUN, Bld mg/dL 15   Alkaline Phosphatase U/L 84   ALT U/L 16   AST U/L 14   Total Bilirubin mg/dL 0.4     URINALYSIS:  No results for input(s): COLORU, CLARITYU, SPECGRAV, PHUR, PROTEINUA, GLUCOSEU, BILIRUBINCON, BLOODU, WBCU, RBCU, BACTERIA, MUCUS, NITRITE, LEUKOCYTESUR, UROBILINOGEN, HYALINECASTS in the last 2160 hours.   LIPIDS:  Recent Labs   Lab Result Units 01/15/20  0735   TSH uIU/mL 1.106   HDL mg/dL 42   Cholesterol mg/dL 181   Triglycerides mg/dL 143   LDL Cholesterol mg/dL 110.4   Hdl/Cholesterol Ratio % 23.2   Non-HDL Cholesterol mg/dL 139   Total Cholesterol/HDL Ratio  4.3     TSH:  Recent Labs   Lab Result Units 01/15/20  0735   TSH uIU/mL 1.106     A1C:  No results for  input(s): HGBA1C in the last 2160 hours.    Radiology:  1/21/2020 MMG WNL    Assessment/Plan     Nicolle Gresham is a 45 y.o.female with:    1. Mild episode of recurrent major depressive disorder  - venlafaxine (EFFEXOR-XR) 75 MG 24 hr capsule; Take 1 capsule (75 mg total) by mouth once daily.  Dispense: 30 capsule; Refill: 11    -Will try Effexor 75 mg daily  -Counseled on increasing exercise.  -Continue current medications and maintain follow up with specialists.  Return to clinic in 4 weeks via video visit      Evelia Peña MD  [facility]    Answers for HPI/ROS submitted by the patient on 3/19/2020   activity change: No  unexpected weight change: No  rhinorrhea: No  trouble swallowing: No  visual disturbance: No  chest tightness: No  polyuria: No  difficulty urinating: No  menstrual problem: No  joint swelling: No  arthralgias: No  confusion: No  dysphoric mood: No

## 2020-03-19 NOTE — ASSESSMENT & PLAN NOTE
Not feeling sad, just feels numb.  Doesn't want to be around anyone.  No panic attacks, SI/HI. Feels different than when she was on Zoloft, Celexa (had decreased libido).  Wellbutrin made her personality different.  Sleeps well at night.  Has not been exercising.

## 2020-03-25 ENCOUNTER — PATIENT MESSAGE (OUTPATIENT)
Dept: FAMILY MEDICINE | Facility: CLINIC | Age: 45
End: 2020-03-25

## 2020-04-13 ENCOUNTER — OFFICE VISIT (OUTPATIENT)
Dept: FAMILY MEDICINE | Facility: CLINIC | Age: 45
End: 2020-04-13
Payer: COMMERCIAL

## 2020-04-13 DIAGNOSIS — F33.0 MILD EPISODE OF RECURRENT MAJOR DEPRESSIVE DISORDER: ICD-10-CM

## 2020-04-13 PROCEDURE — 99213 OFFICE O/P EST LOW 20 MIN: CPT | Mod: 95,,, | Performed by: INTERNAL MEDICINE

## 2020-04-13 PROCEDURE — 99213 PR OFFICE/OUTPT VISIT, EST, LEVL III, 20-29 MIN: ICD-10-PCS | Mod: 95,,, | Performed by: INTERNAL MEDICINE

## 2020-04-13 NOTE — PROGRESS NOTES
The patient location is: home  The chief complaint leading to consultation is: follow up on depression  Visit type: Virtual visit with synchronous audio and video  Total time spent with patient: 10 minutes  Each patient to whom he or she provides medical services by telemedicine is:  (1) informed of the relationship between the physician and patient and the respective role of any other health care provider with respect to management of the patient; and (2) notified that he or she may decline to receive medical services by telemedicine and may withdraw from such care at any time.    [facility] Note    Chief Complaint      Chief Complaint   Patient presents with    Depression     History of Present Illness      Nicolle Gresham is a 45 y.o. female who presents today for depression med follow up.  Patient comes to appointment via virtual visit    Problem List Items Addressed This Visit     Mild episode of recurrent major depressive disorder    Current Assessment & Plan     Doesn't feel numb anymore on Effexor, hasn't noticed any SE's.  No panic attacks, SI/HI. Has been on Zoloft, Celexa (had decreased libido).  Wellbutrin made her personality different.  Sleeps well at night.  Has not been exercising.                 Health Maintenance   Topic Date Due    Pap Smear with HPV Cotest  10/03/2021    Mammogram  2022    TETANUS VACCINE  2023    Lipid Panel  Completed       Past Medical History:   Diagnosis Date    Depression, reactive     GERD (gastroesophageal reflux disease)     Hypothyroidism     Polycystic ovaries        Past Surgical History:   Procedure Laterality Date     SECTION      CHOLECYSTECTOMY      COLONOSCOPY N/A 2016    Procedure: COLONOSCOPY;  Surgeon: Tripp Fletcher Jr., MD;  Location: [facility];  Service: Endoscopy;  Laterality: N/A;    LAPAROSCOPIC GASTRIC BANDING         family history includes Cancer in her maternal grandmother and  paternal grandfather; Colon cancer in her maternal grandmother; Colon polyps in her mother; Early death in her paternal grandmother; Hearing loss in her mother.    Social History     Tobacco Use    Smoking status: Former Smoker     Packs/day: 1.00     Years: 17.00     Pack years: 17.00     Types: Cigarettes     Last attempt to quit: 2015     Years since quittin.6    Smokeless tobacco: Never Used   Substance Use Topics    Alcohol use: Yes     Alcohol/week: 0.0 standard drinks     Frequency: Monthly or less     Drinks per session: 1 or 2     Binge frequency: Never     Comment: rarely    Drug use: No       Review of Systems   Constitutional: Negative for chills and fever.   HENT: Negative for congestion and sore throat.    Eyes: Negative for blurred vision and discharge.   Respiratory: Negative for cough and shortness of breath.    Cardiovascular: Negative for chest pain and palpitations.   Gastrointestinal: Negative for constipation, diarrhea, nausea and vomiting.   Genitourinary: Negative for dysuria and hematuria.   Musculoskeletal: Negative for falls and myalgias.   Skin: Negative for itching and rash.   Neurological: Negative for dizziness and headaches.        Outpatient Encounter Medications as of 2020   Medication Sig Dispense Refill    levothyroxine (SYNTHROID) 50 MCG tablet Take 1 tablet (50 mcg total) by mouth every morning. 90 tablet 3    meclizine (ANTIVERT) 12.5 mg tablet Take 1 tablet (12.5 mg total) by mouth 3 (three) times daily as needed for Dizziness. 90 tablet 0    venlafaxine (EFFEXOR-XR) 75 MG 24 hr capsule Take 1 capsule (75 mg total) by mouth once daily. 30 capsule 11     No facility-administered encounter medications on file as of 2020.         Review of patient's allergies indicates:   Allergen Reactions    Penicillins Nausea And Vomiting     Other reaction(s): Vomiting       Physical Exam       ]  No no vital signs on file    Physical Exam   Constitutional: She is  oriented to person, place, and time. She appears well-developed and well-nourished.   HENT:   Head: Normocephalic and atraumatic.   Eyes: Right eye exhibits no discharge.   Neck: Normal range of motion.   Pulmonary/Chest: Effort normal. No respiratory distress.   Musculoskeletal: Normal range of motion.   Neurological: She is alert and oriented to person, place, and time. Coordination normal.   Skin: No rash noted.   Psychiatric: She has a normal mood and affect. Her behavior is normal. Judgment and thought content normal.        Laboratory:  CBC:  Recent Labs   Lab Result Units 01/15/20  0735   WBC K/uL 5.12   RBC M/uL 4.59   Hemoglobin g/dL 13.5   Hematocrit % 41.4   Platelets K/uL 291   Mean Corpuscular Volume fL 90   Mean Corpuscular Hemoglobin pg 29.4   Mean Corpuscular Hemoglobin Conc g/dL 32.6     CMP:  Recent Labs   Lab Result Units 01/15/20  0735   Glucose mg/dL 96   Calcium mg/dL 9.3   Albumin g/dL 3.8   Total Protein g/dL 7.2   Sodium mmol/L 139   Potassium mmol/L 4.2   CO2 mmol/L 25   Chloride mmol/L 105   BUN, Bld mg/dL 15   Alkaline Phosphatase U/L 84   ALT U/L 16   AST U/L 14   Total Bilirubin mg/dL 0.4     URINALYSIS:  No results for input(s): COLORU, CLARITYU, SPECGRAV, PHUR, PROTEINUA, GLUCOSEU, BILIRUBINCON, BLOODU, WBCU, RBCU, BACTERIA, MUCUS, NITRITE, LEUKOCYTESUR, UROBILINOGEN, HYALINECASTS in the last 2160 hours.   LIPIDS:  Recent Labs   Lab Result Units 01/15/20  0735   TSH uIU/mL 1.106   HDL mg/dL 42   Cholesterol mg/dL 181   Triglycerides mg/dL 143   LDL Cholesterol mg/dL 110.4   Hdl/Cholesterol Ratio % 23.2   Non-HDL Cholesterol mg/dL 139   Total Cholesterol/HDL Ratio  4.3     TSH:  Recent Labs   Lab Result Units 01/15/20  0735   TSH uIU/mL 1.106     A1C:  No results for input(s): HGBA1C in the last 2160 hours.    Radiology:  1/21/2020 MMG WNL    Assessment/Plan     Nicolle Gresham is a 45 y.o.female with:    1. Mild episode of recurrent major depressive disorder    -Continue Effexor, will  let me know when she is ready to wean off once COVID 19 settles down  -Counseled on increasing exercise again  -Continue current medications and maintain follow up with specialists.  Return to clinic in 1/2021 with labs prior.      Evelia Peña MD  [facility] [place]

## 2020-04-13 NOTE — ASSESSMENT & PLAN NOTE
Doesn't feel numb anymore on Effexor, hasn't noticed any SE's.  No panic attacks, SI/HI. Has been on Zoloft, Celexa (had decreased libido).  Wellbutrin made her personality different.  Sleeps well at night.  Has not been exercising.

## 2021-02-03 DIAGNOSIS — Z12.31 OTHER SCREENING MAMMOGRAM: ICD-10-CM

## 2021-02-12 DIAGNOSIS — E03.9 ACQUIRED HYPOTHYROIDISM: ICD-10-CM

## 2021-02-12 RX ORDER — LEVOTHYROXINE SODIUM 50 UG/1
50 TABLET ORAL EVERY MORNING
Qty: 90 TABLET | Refills: 3 | Status: SHIPPED | OUTPATIENT
Start: 2021-02-12 | End: 2022-01-10 | Stop reason: SDUPTHER

## 2021-04-05 ENCOUNTER — PATIENT MESSAGE (OUTPATIENT)
Dept: ADMINISTRATIVE | Facility: HOSPITAL | Age: 46
End: 2021-04-05

## 2021-04-15 ENCOUNTER — PATIENT MESSAGE (OUTPATIENT)
Dept: RESEARCH | Facility: HOSPITAL | Age: 46
End: 2021-04-15

## 2021-07-07 ENCOUNTER — PATIENT MESSAGE (OUTPATIENT)
Dept: ADMINISTRATIVE | Facility: HOSPITAL | Age: 46
End: 2021-07-07

## 2021-07-19 ENCOUNTER — PATIENT OUTREACH (OUTPATIENT)
Dept: ADMINISTRATIVE | Facility: HOSPITAL | Age: 46
End: 2021-07-19

## 2021-08-24 ENCOUNTER — PATIENT OUTREACH (OUTPATIENT)
Dept: ADMINISTRATIVE | Facility: HOSPITAL | Age: 46
End: 2021-08-24

## 2021-10-05 ENCOUNTER — PATIENT MESSAGE (OUTPATIENT)
Dept: ADMINISTRATIVE | Facility: HOSPITAL | Age: 46
End: 2021-10-05

## 2021-11-18 ENCOUNTER — OFFICE VISIT (OUTPATIENT)
Dept: URGENT CARE | Facility: CLINIC | Age: 46
End: 2021-11-18
Payer: COMMERCIAL

## 2021-11-18 VITALS
TEMPERATURE: 98 F | DIASTOLIC BLOOD PRESSURE: 78 MMHG | OXYGEN SATURATION: 98 % | HEART RATE: 66 BPM | SYSTOLIC BLOOD PRESSURE: 117 MMHG | RESPIRATION RATE: 18 BRPM | BODY MASS INDEX: 37.56 KG/M2 | WEIGHT: 212 LBS | HEIGHT: 63 IN

## 2021-11-18 DIAGNOSIS — J06.9 URI, ACUTE: ICD-10-CM

## 2021-11-18 DIAGNOSIS — J02.9 SORE THROAT: Primary | ICD-10-CM

## 2021-11-18 DIAGNOSIS — H69.93 EUSTACHIAN TUBE DISORDER, BILATERAL: ICD-10-CM

## 2021-11-18 LAB
CTP QC/QA: YES
SARS-COV-2 RDRP RESP QL NAA+PROBE: NEGATIVE

## 2021-11-18 PROCEDURE — U0002: ICD-10-PCS | Mod: QW,S$GLB,, | Performed by: FAMILY MEDICINE

## 2021-11-18 PROCEDURE — 3008F PR BODY MASS INDEX (BMI) DOCUMENTED: ICD-10-PCS | Mod: CPTII,S$GLB,, | Performed by: FAMILY MEDICINE

## 2021-11-18 PROCEDURE — 3074F SYST BP LT 130 MM HG: CPT | Mod: CPTII,S$GLB,, | Performed by: FAMILY MEDICINE

## 2021-11-18 PROCEDURE — 99213 OFFICE O/P EST LOW 20 MIN: CPT | Mod: S$GLB,,, | Performed by: FAMILY MEDICINE

## 2021-11-18 PROCEDURE — 1159F PR MEDICATION LIST DOCUMENTED IN MEDICAL RECORD: ICD-10-PCS | Mod: CPTII,S$GLB,, | Performed by: FAMILY MEDICINE

## 2021-11-18 PROCEDURE — 3078F DIAST BP <80 MM HG: CPT | Mod: CPTII,S$GLB,, | Performed by: FAMILY MEDICINE

## 2021-11-18 PROCEDURE — U0002 COVID-19 LAB TEST NON-CDC: HCPCS | Mod: QW,S$GLB,, | Performed by: FAMILY MEDICINE

## 2021-11-18 PROCEDURE — 1159F MED LIST DOCD IN RCRD: CPT | Mod: CPTII,S$GLB,, | Performed by: FAMILY MEDICINE

## 2021-11-18 PROCEDURE — 99213 PR OFFICE/OUTPT VISIT, EST, LEVL III, 20-29 MIN: ICD-10-PCS | Mod: S$GLB,,, | Performed by: FAMILY MEDICINE

## 2021-11-18 PROCEDURE — 3078F PR MOST RECENT DIASTOLIC BLOOD PRESSURE < 80 MM HG: ICD-10-PCS | Mod: CPTII,S$GLB,, | Performed by: FAMILY MEDICINE

## 2021-11-18 PROCEDURE — 3074F PR MOST RECENT SYSTOLIC BLOOD PRESSURE < 130 MM HG: ICD-10-PCS | Mod: CPTII,S$GLB,, | Performed by: FAMILY MEDICINE

## 2021-11-18 PROCEDURE — 3008F BODY MASS INDEX DOCD: CPT | Mod: CPTII,S$GLB,, | Performed by: FAMILY MEDICINE

## 2021-11-18 RX ORDER — FLUTICASONE PROPIONATE 50 MCG
1 SPRAY, SUSPENSION (ML) NASAL DAILY
Qty: 18 G | Refills: 3 | Status: SHIPPED | OUTPATIENT
Start: 2021-11-18 | End: 2021-12-18

## 2021-11-21 ENCOUNTER — OFFICE VISIT (OUTPATIENT)
Dept: URGENT CARE | Facility: CLINIC | Age: 46
End: 2021-11-21
Payer: COMMERCIAL

## 2021-11-21 VITALS
HEART RATE: 74 BPM | BODY MASS INDEX: 37.56 KG/M2 | TEMPERATURE: 99 F | RESPIRATION RATE: 17 BRPM | WEIGHT: 212 LBS | SYSTOLIC BLOOD PRESSURE: 135 MMHG | DIASTOLIC BLOOD PRESSURE: 89 MMHG | HEIGHT: 63 IN | OXYGEN SATURATION: 99 %

## 2021-11-21 DIAGNOSIS — J02.9 PHARYNGITIS, UNSPECIFIED ETIOLOGY: Primary | ICD-10-CM

## 2021-11-21 DIAGNOSIS — J02.9 SORE THROAT: ICD-10-CM

## 2021-11-21 LAB
CTP QC/QA: YES
MOLECULAR STREP A: NEGATIVE

## 2021-11-21 PROCEDURE — 99213 OFFICE O/P EST LOW 20 MIN: CPT | Mod: S$GLB,,, | Performed by: PHYSICIAN ASSISTANT

## 2021-11-21 PROCEDURE — 87651 POCT STREP A MOLECULAR: ICD-10-PCS | Mod: QW,S$GLB,, | Performed by: PHYSICIAN ASSISTANT

## 2021-11-21 PROCEDURE — 87651 STREP A DNA AMP PROBE: CPT | Mod: QW,S$GLB,, | Performed by: PHYSICIAN ASSISTANT

## 2021-11-21 PROCEDURE — 99213 PR OFFICE/OUTPT VISIT, EST, LEVL III, 20-29 MIN: ICD-10-PCS | Mod: S$GLB,,, | Performed by: PHYSICIAN ASSISTANT

## 2021-11-21 RX ORDER — CEFDINIR 300 MG/1
300 CAPSULE ORAL 2 TIMES DAILY
Qty: 10 CAPSULE | Refills: 0 | Status: SHIPPED | OUTPATIENT
Start: 2021-11-21 | End: 2021-11-26

## 2022-01-10 ENCOUNTER — PATIENT MESSAGE (OUTPATIENT)
Dept: ADMINISTRATIVE | Facility: HOSPITAL | Age: 47
End: 2022-01-10
Payer: COMMERCIAL

## 2022-01-10 ENCOUNTER — OFFICE VISIT (OUTPATIENT)
Dept: FAMILY MEDICINE | Facility: CLINIC | Age: 47
End: 2022-01-10
Payer: COMMERCIAL

## 2022-01-10 VITALS
DIASTOLIC BLOOD PRESSURE: 78 MMHG | HEART RATE: 71 BPM | BODY MASS INDEX: 38.82 KG/M2 | WEIGHT: 219.13 LBS | SYSTOLIC BLOOD PRESSURE: 126 MMHG | OXYGEN SATURATION: 98 % | TEMPERATURE: 98 F

## 2022-01-10 DIAGNOSIS — E03.9 ACQUIRED HYPOTHYROIDISM: ICD-10-CM

## 2022-01-10 DIAGNOSIS — Z12.11 SCREENING FOR MALIGNANT NEOPLASM OF COLON: ICD-10-CM

## 2022-01-10 DIAGNOSIS — F33.0 MILD EPISODE OF RECURRENT MAJOR DEPRESSIVE DISORDER: ICD-10-CM

## 2022-01-10 DIAGNOSIS — Z23 NEED FOR INFLUENZA VACCINATION: ICD-10-CM

## 2022-01-10 DIAGNOSIS — Z12.31 ENCOUNTER FOR SCREENING MAMMOGRAM FOR MALIGNANT NEOPLASM OF BREAST: ICD-10-CM

## 2022-01-10 DIAGNOSIS — Z11.59 SCREENING FOR VIRAL DISEASE: ICD-10-CM

## 2022-01-10 DIAGNOSIS — Z00.00 ANNUAL PHYSICAL EXAM: Primary | ICD-10-CM

## 2022-01-10 PROCEDURE — 3008F BODY MASS INDEX DOCD: CPT | Mod: CPTII,S$GLB,, | Performed by: INTERNAL MEDICINE

## 2022-01-10 PROCEDURE — 3008F PR BODY MASS INDEX (BMI) DOCUMENTED: ICD-10-PCS | Mod: CPTII,S$GLB,, | Performed by: INTERNAL MEDICINE

## 2022-01-10 PROCEDURE — 99999 PR PBB SHADOW E&M-EST. PATIENT-LVL III: ICD-10-PCS | Mod: PBBFAC,,, | Performed by: INTERNAL MEDICINE

## 2022-01-10 PROCEDURE — 90471 IMMUNIZATION ADMIN: CPT | Mod: S$GLB,,, | Performed by: INTERNAL MEDICINE

## 2022-01-10 PROCEDURE — 99396 PR PREVENTIVE VISIT,EST,40-64: ICD-10-PCS | Mod: 25,S$GLB,, | Performed by: INTERNAL MEDICINE

## 2022-01-10 PROCEDURE — 1159F PR MEDICATION LIST DOCUMENTED IN MEDICAL RECORD: ICD-10-PCS | Mod: CPTII,S$GLB,, | Performed by: INTERNAL MEDICINE

## 2022-01-10 PROCEDURE — 3074F PR MOST RECENT SYSTOLIC BLOOD PRESSURE < 130 MM HG: ICD-10-PCS | Mod: CPTII,S$GLB,, | Performed by: INTERNAL MEDICINE

## 2022-01-10 PROCEDURE — 99396 PREV VISIT EST AGE 40-64: CPT | Mod: 25,S$GLB,, | Performed by: INTERNAL MEDICINE

## 2022-01-10 PROCEDURE — 1159F MED LIST DOCD IN RCRD: CPT | Mod: CPTII,S$GLB,, | Performed by: INTERNAL MEDICINE

## 2022-01-10 PROCEDURE — 90686 IIV4 VACC NO PRSV 0.5 ML IM: CPT | Mod: S$GLB,,, | Performed by: INTERNAL MEDICINE

## 2022-01-10 PROCEDURE — 90686 FLU VACCINE (QUAD) GREATER THAN OR EQUAL TO 3YO PRESERVATIVE FREE IM: ICD-10-PCS | Mod: S$GLB,,, | Performed by: INTERNAL MEDICINE

## 2022-01-10 PROCEDURE — 3074F SYST BP LT 130 MM HG: CPT | Mod: CPTII,S$GLB,, | Performed by: INTERNAL MEDICINE

## 2022-01-10 PROCEDURE — 3078F PR MOST RECENT DIASTOLIC BLOOD PRESSURE < 80 MM HG: ICD-10-PCS | Mod: CPTII,S$GLB,, | Performed by: INTERNAL MEDICINE

## 2022-01-10 PROCEDURE — 3078F DIAST BP <80 MM HG: CPT | Mod: CPTII,S$GLB,, | Performed by: INTERNAL MEDICINE

## 2022-01-10 PROCEDURE — 90471 FLU VACCINE (QUAD) GREATER THAN OR EQUAL TO 3YO PRESERVATIVE FREE IM: ICD-10-PCS | Mod: S$GLB,,, | Performed by: INTERNAL MEDICINE

## 2022-01-10 PROCEDURE — 99999 PR PBB SHADOW E&M-EST. PATIENT-LVL III: CPT | Mod: PBBFAC,,, | Performed by: INTERNAL MEDICINE

## 2022-01-10 RX ORDER — SERTRALINE HYDROCHLORIDE 25 MG/1
25 TABLET, FILM COATED ORAL DAILY
Qty: 90 TABLET | Refills: 3 | Status: SHIPPED | OUTPATIENT
Start: 2022-01-10 | End: 2023-01-23

## 2022-01-10 RX ORDER — LEVOTHYROXINE SODIUM 50 UG/1
50 TABLET ORAL EVERY MORNING
Qty: 90 TABLET | Refills: 3 | Status: SHIPPED | OUTPATIENT
Start: 2022-01-10

## 2022-01-10 NOTE — ASSESSMENT & PLAN NOTE
No longer on Effexor for > 1 year.  Found out  was unfaithful on 10/14/2021 and has been having issues since.  No panic attacks, SI/HI. Has been on Zoloft, Celexa (had decreased libido).   Would like to try Zoloft again.  Seeing therapist now, Kiah Valladares. Wellbutrin made her personality different.

## 2022-01-10 NOTE — PROGRESS NOTES
[facility] Note    Chief Complaint      Chief Complaint   Patient presents with    Follow-up     History of Present Illness      Nicolle Gresham is a 47 y.o. female who presents today for annual preventative visit.  Patient comes to appointment alone.  GYN:     Has not been exercising.  Has lost 15 pounds since 10/2021.    Problem List Items Addressed This Visit     Acquired hypothyroidism    Current Assessment & Plan     Stable on synthroid 50 mcg daily, no S/S of hypo/hyperthyroidism.  Has been on same dose since 2005.           Relevant Medications    levothyroxine (SYNTHROID) 50 MCG tablet    Other Relevant Orders    TSH    T4, Free    Mild episode of recurrent major depressive disorder    Current Assessment & Plan     No longer on Effexor for > 1 year.  Found out  was unfaithful on 10/14/2021 and has been having issues since.  No panic attacks, SI/HI. Has been on Zoloft, Celexa (had decreased libido).   Would like to try Zoloft again.  Seeing therapist now, Kiah Valladares. Wellbutrin made her personality different.             Relevant Medications    sertraline (ZOLOFT) 25 MG tablet      Other Visit Diagnoses     Annual physical exam    -  Primary    Relevant Orders    CBC Auto Differential    Lipid Panel    Comprehensive Metabolic Panel    TSH    Screening for viral disease        Relevant Orders    Hepatitis C Antibody    Need for influenza vaccination        Relevant Orders    Influenza - Quadrivalent *Preferred* (6 months+) (PF)    Screening for malignant neoplasm of colon        Relevant Orders    Case Request Endoscopy: COLONOSCOPY (Completed)    Encounter for screening mammogram for malignant neoplasm of breast        Relevant Orders    Mammo Digital Screening Bilat w/ Darrel          Health Maintenance   Topic Date Due    Hepatitis C Screening  Never done    Mammogram  01/21/2021    TETANUS VACCINE  11/04/2023    Lipid Panel  01/15/2025       Past Medical History:   Diagnosis  Date    Depression, reactive     GERD (gastroesophageal reflux disease)     Hypothyroidism     Polycystic ovaries        Past Surgical History:   Procedure Laterality Date     SECTION      CHOLECYSTECTOMY      COLONOSCOPY N/A 2016    Procedure: COLONOSCOPY;  Surgeon: Tripp Fletcher Jr., MD;  Location: [facility];  Service: Endoscopy;  Laterality: N/A;    LAPAROSCOPIC GASTRIC BANDING         family history includes Cancer in her maternal grandmother and paternal grandfather; Colon cancer in her maternal grandmother; Colon polyps in her mother; Early death in her paternal grandmother; Hearing loss in her mother.    Social History     Tobacco Use    Smoking status: Former Smoker     Packs/day: 1.00     Years: 17.00     Pack years: 17.00     Types: Cigarettes     Quit date: 2015     Years since quittin.4    Smokeless tobacco: Never Used   Substance Use Topics    Alcohol use: Yes     Alcohol/week: 0.0 standard drinks     Comment: rarely    Drug use: No       Review of Systems   Constitutional: Negative for chills and fever.   HENT: Negative for hearing loss and sore throat.    Eyes: Negative for discharge.   Respiratory: Negative for cough, shortness of breath and wheezing.    Cardiovascular: Negative for chest pain and palpitations.   Gastrointestinal: Negative for blood in stool, constipation, diarrhea, nausea and vomiting.   Genitourinary: Negative for dysuria and hematuria.   Musculoskeletal: Negative for falls and neck pain.   Neurological: Negative for weakness and headaches.   Endo/Heme/Allergies: Negative for polydipsia.        Outpatient Encounter Medications as of 1/10/2022   Medication Sig Dispense Refill    [DISCONTINUED] levothyroxine (SYNTHROID) 50 MCG tablet Take 1 tablet (50 mcg total) by mouth every morning. 90 tablet 3    levothyroxine (SYNTHROID) 50 MCG tablet Take 1 tablet (50 mcg total) by mouth every morning. 90 tablet 3    sertraline (ZOLOFT) 25 MG tablet Take  1 tablet (25 mg total) by mouth once daily. 90 tablet 3    [DISCONTINUED] meclizine (ANTIVERT) 12.5 mg tablet Take 1 tablet (12.5 mg total) by mouth 3 (three) times daily as needed for Dizziness. 90 tablet 0    [DISCONTINUED] venlafaxine (EFFEXOR-XR) 75 MG 24 hr capsule Take 1 capsule (75 mg total) by mouth once daily. 30 capsule 11     No facility-administered encounter medications on file as of 1/10/2022.        Review of patient's allergies indicates:   Allergen Reactions    Penicillins Nausea And Vomiting     Other reaction(s): Vomiting       Physical Exam      Vital Signs  Temp: 98.2 °F (36.8 °C)  Pulse: 71  SpO2: 98 %  BP: 126/78  BP Location: Left arm  Patient Position: Sitting  Pain Score: 0-No pain  Height and Weight  Weight: 99.4 kg (219 lb 2.2 oz)]    Physical Exam  Constitutional:       Appearance: She is well-developed.   HENT:      Head: Normocephalic and atraumatic.      Right Ear: External ear normal.      Left Ear: External ear normal.   Eyes:      General:         Right eye: No discharge.         Left eye: No discharge.   Cardiovascular:      Rate and Rhythm: Normal rate and regular rhythm.      Heart sounds: Normal heart sounds. No murmur heard.      Pulmonary:      Effort: Pulmonary effort is normal. No respiratory distress.      Breath sounds: Normal breath sounds.   Abdominal:      General: There is no distension.      Palpations: Abdomen is soft.      Tenderness: There is no abdominal tenderness. There is no guarding.   Musculoskeletal:         General: Normal range of motion.      Cervical back: Normal range of motion.   Skin:     General: Skin is warm and dry.   Neurological:      Mental Status: She is alert and oriented to person, place, and time.   Psychiatric:         Behavior: Behavior normal.          Laboratory:  CBC:  No results for input(s): WBC, RBC, HGB, HCT, PLT, MCV, MCH, MCHC in the last 2160 hours.  CMP:  No results for input(s): GLU, CALCIUM, ALBUMIN, PROT, NA, K, CO2, CL,  BUN, ALKPHOS, ALT, AST, BILITOT in the last 2160 hours.    Invalid input(s): CREATININ  URINALYSIS:  No results for input(s): COLORU, CLARITYU, SPECGRAV, PHUR, PROTEINUA, GLUCOSEU, BILIRUBINCON, BLOODU, WBCU, RBCU, BACTERIA, MUCUS, NITRITE, LEUKOCYTESUR, UROBILINOGEN, HYALINECASTS in the last 2160 hours.   LIPIDS:  No results for input(s): TSH, HDL, CHOL, TRIG, LDLCALC, CHOLHDL, NONHDLCHOL, TOTALCHOLEST in the last 2160 hours.  TSH:  No results for input(s): TSH in the last 2160 hours.  A1C:  No results for input(s): HGBA1C in the last 2160 hours.    Radiology:  No results found in the last 30 days.     Assessment/Plan     Nicolle Gresham is a 47 y.o.female with:    1. Annual physical exam  - CBC Auto Differential; Future  - Lipid Panel; Future  - Comprehensive Metabolic Panel; Future  - TSH; Future    2. Acquired hypothyroidism  - TSH; Future  - T4, Free; Future  - levothyroxine (SYNTHROID) 50 MCG tablet; Take 1 tablet (50 mcg total) by mouth every morning.  Dispense: 90 tablet; Refill: 3    3. Mild episode of recurrent major depressive disorder  - sertraline (ZOLOFT) 25 MG tablet; Take 1 tablet (25 mg total) by mouth once daily.  Dispense: 90 tablet; Refill: 3    4. Screening for viral disease  - Hepatitis C Antibody; Future    5. Need for influenza vaccination  - Influenza - Quadrivalent *Preferred* (6 months+) (PF)    6. Screening for malignant neoplasm of colon  - Case Request Endoscopy: COLONOSCOPY    7. Encounter for screening mammogram for malignant neoplasm of breast  - Mammo Digital Screening Bilat w/ Darrel; Future    -start zoloft 25 mg daily for depression  -counseled on increasing exercise  -labs ordered  -refer to Laila for cscope  -counseled on getting COVID booster, flu shot today  -call Dr. Streeter about Pap  -MMG ordered  -Continue current medications and maintain follow up with specialists.    -Follow up in about 4 weeks (around 2/7/2022) for Virtual Visit.       Evelia Peña MD  [facility]                  Answers for HPI/ROS submitted by the patient on 1/9/2022  activity change: No  unexpected weight change: No  rhinorrhea: No  trouble swallowing: No  visual disturbance: Yes  chest tightness: No  polyuria: No  difficulty urinating: No  menstrual problem: No  joint swelling: No  arthralgias: No  confusion: No  dysphoric mood: Yes

## 2022-03-19 ENCOUNTER — TELEPHONE (OUTPATIENT)
Dept: ENDOSCOPY | Facility: HOSPITAL | Age: 47
End: 2022-03-19
Payer: COMMERCIAL

## 2022-03-19 NOTE — TELEPHONE ENCOUNTER
Attempted to contact Nicolle Gresham  to schedule procedure , no answer, left message with call back number

## 2022-05-30 ENCOUNTER — PATIENT MESSAGE (OUTPATIENT)
Dept: ADMINISTRATIVE | Facility: HOSPITAL | Age: 47
End: 2022-05-30
Payer: COMMERCIAL

## 2022-07-11 ENCOUNTER — PATIENT MESSAGE (OUTPATIENT)
Dept: ADMINISTRATIVE | Facility: HOSPITAL | Age: 47
End: 2022-07-11
Payer: COMMERCIAL

## 2022-08-24 ENCOUNTER — PATIENT MESSAGE (OUTPATIENT)
Dept: ADMINISTRATIVE | Facility: HOSPITAL | Age: 47
End: 2022-08-24
Payer: COMMERCIAL

## 2022-10-10 ENCOUNTER — PATIENT MESSAGE (OUTPATIENT)
Dept: ADMINISTRATIVE | Facility: HOSPITAL | Age: 47
End: 2022-10-10
Payer: COMMERCIAL

## 2023-03-16 DIAGNOSIS — Z12.31 OTHER SCREENING MAMMOGRAM: ICD-10-CM

## 2023-03-22 ENCOUNTER — PATIENT MESSAGE (OUTPATIENT)
Dept: ADMINISTRATIVE | Facility: HOSPITAL | Age: 48
End: 2023-03-22
Payer: COMMERCIAL

## 2023-04-03 ENCOUNTER — PATIENT MESSAGE (OUTPATIENT)
Dept: ADMINISTRATIVE | Facility: HOSPITAL | Age: 48
End: 2023-04-03
Payer: COMMERCIAL

## 2024-06-06 NOTE — PROGRESS NOTES
Labs look great, will discuss more at upcoming appointment. Oriented - self; Oriented - place; Oriented - time